# Patient Record
Sex: MALE | Race: OTHER | HISPANIC OR LATINO
[De-identification: names, ages, dates, MRNs, and addresses within clinical notes are randomized per-mention and may not be internally consistent; named-entity substitution may affect disease eponyms.]

---

## 2022-03-03 ENCOUNTER — APPOINTMENT (OUTPATIENT)
Dept: OTOLARYNGOLOGY | Facility: CLINIC | Age: 72
End: 2022-03-03
Payer: MEDICARE

## 2022-03-03 VITALS
SYSTOLIC BLOOD PRESSURE: 133 MMHG | DIASTOLIC BLOOD PRESSURE: 80 MMHG | BODY MASS INDEX: 34.54 KG/M2 | HEART RATE: 86 BPM | HEIGHT: 72 IN | WEIGHT: 255 LBS

## 2022-03-03 DIAGNOSIS — H61.20 IMPACTED CERUMEN, UNSPECIFIED EAR: ICD-10-CM

## 2022-03-03 PROCEDURE — 69210 REMOVE IMPACTED EAR WAX UNI: CPT

## 2022-03-03 PROCEDURE — 99203 OFFICE O/P NEW LOW 30 MIN: CPT | Mod: 25

## 2022-03-03 NOTE — PHYSICAL EXAM
[FreeTextEntry1] : CONGENITAL EXOPHTHALMIA [de-identified] : LEFT IMPACTED CERUMEN REMOVED/ HEARING IMPROVED [Normal] : mucosa is normal [Midline] : trachea located in midline position

## 2022-03-03 NOTE — REVIEW OF SYSTEMS
[Eyes Itch] : itching of the eyes [Joint Pain] : joint pain [Negative] : Heme/Lymph [Patient Intake Form Reviewed] : Patient intake form was reviewed

## 2023-05-17 ENCOUNTER — INPATIENT (INPATIENT)
Facility: HOSPITAL | Age: 73
LOS: 3 days | Discharge: HOME | DRG: 481 | End: 2023-05-21
Attending: ORTHOPAEDIC SURGERY | Admitting: STUDENT IN AN ORGANIZED HEALTH CARE EDUCATION/TRAINING PROGRAM
Payer: MEDICARE

## 2023-05-17 ENCOUNTER — TRANSCRIPTION ENCOUNTER (OUTPATIENT)
Age: 73
End: 2023-05-17

## 2023-05-17 VITALS
SYSTOLIC BLOOD PRESSURE: 156 MMHG | HEART RATE: 92 BPM | DIASTOLIC BLOOD PRESSURE: 73 MMHG | WEIGHT: 259.93 LBS | RESPIRATION RATE: 18 BRPM | TEMPERATURE: 98 F | OXYGEN SATURATION: 97 %

## 2023-05-17 DIAGNOSIS — Z95.5 PRESENCE OF CORONARY ANGIOPLASTY IMPLANT AND GRAFT: Chronic | ICD-10-CM

## 2023-05-17 DIAGNOSIS — S82.91XA UNSPECIFIED FRACTURE OF RIGHT LOWER LEG, INITIAL ENCOUNTER FOR CLOSED FRACTURE: Chronic | ICD-10-CM

## 2023-05-17 DIAGNOSIS — S72.009A FRACTURE OF UNSPECIFIED PART OF NECK OF UNSPECIFIED FEMUR, INITIAL ENCOUNTER FOR CLOSED FRACTURE: ICD-10-CM

## 2023-05-17 DIAGNOSIS — Z98.890 OTHER SPECIFIED POSTPROCEDURAL STATES: Chronic | ICD-10-CM

## 2023-05-17 LAB
ALBUMIN SERPL ELPH-MCNC: 4.2 G/DL — SIGNIFICANT CHANGE UP (ref 3.3–5.2)
ALP SERPL-CCNC: 72 U/L — SIGNIFICANT CHANGE UP (ref 40–120)
ALT FLD-CCNC: 24 U/L — SIGNIFICANT CHANGE UP
ANION GAP SERPL CALC-SCNC: 15 MMOL/L — SIGNIFICANT CHANGE UP (ref 5–17)
APTT BLD: 33.1 SEC — SIGNIFICANT CHANGE UP (ref 27.5–35.5)
AST SERPL-CCNC: 30 U/L — SIGNIFICANT CHANGE UP
BASOPHILS # BLD AUTO: 0.01 K/UL — SIGNIFICANT CHANGE UP (ref 0–0.2)
BASOPHILS NFR BLD AUTO: 0.1 % — SIGNIFICANT CHANGE UP (ref 0–2)
BILIRUB SERPL-MCNC: 0.4 MG/DL — SIGNIFICANT CHANGE UP (ref 0.4–2)
BLD GP AB SCN SERPL QL: SIGNIFICANT CHANGE UP
BUN SERPL-MCNC: 21.7 MG/DL — HIGH (ref 8–20)
CALCIUM SERPL-MCNC: 9.3 MG/DL — SIGNIFICANT CHANGE UP (ref 8.4–10.5)
CHLORIDE SERPL-SCNC: 100 MMOL/L — SIGNIFICANT CHANGE UP (ref 96–108)
CO2 SERPL-SCNC: 25 MMOL/L — SIGNIFICANT CHANGE UP (ref 22–29)
CREAT SERPL-MCNC: 0.84 MG/DL — SIGNIFICANT CHANGE UP (ref 0.5–1.3)
EGFR: 93 ML/MIN/1.73M2 — SIGNIFICANT CHANGE UP
EOSINOPHIL # BLD AUTO: 0.22 K/UL — SIGNIFICANT CHANGE UP (ref 0–0.5)
EOSINOPHIL NFR BLD AUTO: 3 % — SIGNIFICANT CHANGE UP (ref 0–6)
GLUCOSE BLDC GLUCOMTR-MCNC: 153 MG/DL — HIGH (ref 70–99)
GLUCOSE SERPL-MCNC: 107 MG/DL — HIGH (ref 70–99)
HCT VFR BLD CALC: 39.7 % — SIGNIFICANT CHANGE UP (ref 39–50)
HGB BLD-MCNC: 13.2 G/DL — SIGNIFICANT CHANGE UP (ref 13–17)
IMM GRANULOCYTES NFR BLD AUTO: 0.5 % — SIGNIFICANT CHANGE UP (ref 0–0.9)
INR BLD: 1.11 RATIO — SIGNIFICANT CHANGE UP (ref 0.88–1.16)
LYMPHOCYTES # BLD AUTO: 1.29 K/UL — SIGNIFICANT CHANGE UP (ref 1–3.3)
LYMPHOCYTES # BLD AUTO: 17.6 % — SIGNIFICANT CHANGE UP (ref 13–44)
MCHC RBC-ENTMCNC: 31.6 PG — SIGNIFICANT CHANGE UP (ref 27–34)
MCHC RBC-ENTMCNC: 33.2 GM/DL — SIGNIFICANT CHANGE UP (ref 32–36)
MCV RBC AUTO: 95 FL — SIGNIFICANT CHANGE UP (ref 80–100)
MONOCYTES # BLD AUTO: 0.51 K/UL — SIGNIFICANT CHANGE UP (ref 0–0.9)
MONOCYTES NFR BLD AUTO: 7 % — SIGNIFICANT CHANGE UP (ref 2–14)
NEUTROPHILS # BLD AUTO: 5.24 K/UL — SIGNIFICANT CHANGE UP (ref 1.8–7.4)
NEUTROPHILS NFR BLD AUTO: 71.8 % — SIGNIFICANT CHANGE UP (ref 43–77)
PLATELET # BLD AUTO: 142 K/UL — LOW (ref 150–400)
POTASSIUM SERPL-MCNC: 4 MMOL/L — SIGNIFICANT CHANGE UP (ref 3.5–5.3)
POTASSIUM SERPL-SCNC: 4 MMOL/L — SIGNIFICANT CHANGE UP (ref 3.5–5.3)
PROT SERPL-MCNC: 6.8 G/DL — SIGNIFICANT CHANGE UP (ref 6.6–8.7)
PROTHROM AB SERPL-ACNC: 12.9 SEC — SIGNIFICANT CHANGE UP (ref 10.5–13.4)
RBC # BLD: 4.18 M/UL — LOW (ref 4.2–5.8)
RBC # FLD: 13.5 % — SIGNIFICANT CHANGE UP (ref 10.3–14.5)
SODIUM SERPL-SCNC: 140 MMOL/L — SIGNIFICANT CHANGE UP (ref 135–145)
WBC # BLD: 7.31 K/UL — SIGNIFICANT CHANGE UP (ref 3.8–10.5)
WBC # FLD AUTO: 7.31 K/UL — SIGNIFICANT CHANGE UP (ref 3.8–10.5)

## 2023-05-17 PROCEDURE — 99223 1ST HOSP IP/OBS HIGH 75: CPT | Mod: 57

## 2023-05-17 PROCEDURE — 71045 X-RAY EXAM CHEST 1 VIEW: CPT | Mod: 26

## 2023-05-17 PROCEDURE — 99285 EMERGENCY DEPT VISIT HI MDM: CPT

## 2023-05-17 PROCEDURE — 73552 X-RAY EXAM OF FEMUR 2/>: CPT | Mod: 26,LT

## 2023-05-17 PROCEDURE — 73502 X-RAY EXAM HIP UNI 2-3 VIEWS: CPT | Mod: 26,LT

## 2023-05-17 PROCEDURE — 93010 ELECTROCARDIOGRAM REPORT: CPT

## 2023-05-17 RX ORDER — ENOXAPARIN SODIUM 100 MG/ML
40 INJECTION SUBCUTANEOUS ONCE
Refills: 0 | Status: COMPLETED | OUTPATIENT
Start: 2023-05-17 | End: 2023-05-17

## 2023-05-17 RX ORDER — ATORVASTATIN CALCIUM 80 MG/1
1 TABLET, FILM COATED ORAL
Refills: 0 | DISCHARGE

## 2023-05-17 RX ORDER — DEXTROSE 50 % IN WATER 50 %
25 SYRINGE (ML) INTRAVENOUS ONCE
Refills: 0 | Status: DISCONTINUED | OUTPATIENT
Start: 2023-05-17 | End: 2023-05-18

## 2023-05-17 RX ORDER — CARVEDILOL PHOSPHATE 80 MG/1
3.12 CAPSULE, EXTENDED RELEASE ORAL EVERY 12 HOURS
Refills: 0 | Status: DISCONTINUED | OUTPATIENT
Start: 2023-05-17 | End: 2023-05-18

## 2023-05-17 RX ORDER — POVIDONE-IODINE 5 %
1 AEROSOL (ML) TOPICAL ONCE
Refills: 0 | Status: DISCONTINUED | OUTPATIENT
Start: 2023-05-17 | End: 2023-05-18

## 2023-05-17 RX ORDER — MORPHINE SULFATE 50 MG/1
4 CAPSULE, EXTENDED RELEASE ORAL ONCE
Refills: 0 | Status: DISCONTINUED | OUTPATIENT
Start: 2023-05-17 | End: 2023-05-17

## 2023-05-17 RX ORDER — COLESEVELAM HYDROCHLORIDE 625 MG/1
3 TABLET, FILM COATED ORAL
Refills: 0 | DISCHARGE

## 2023-05-17 RX ORDER — AMLODIPINE BESYLATE 2.5 MG/1
10 TABLET ORAL DAILY
Refills: 0 | Status: DISCONTINUED | OUTPATIENT
Start: 2023-05-17 | End: 2023-05-18

## 2023-05-17 RX ORDER — ASPIRIN/CALCIUM CARB/MAGNESIUM 324 MG
1 TABLET ORAL
Refills: 0 | DISCHARGE

## 2023-05-17 RX ORDER — OXYCODONE HYDROCHLORIDE 5 MG/1
10 TABLET ORAL EVERY 4 HOURS
Refills: 0 | Status: DISCONTINUED | OUTPATIENT
Start: 2023-05-17 | End: 2023-05-18

## 2023-05-17 RX ORDER — TRANEXAMIC ACID 100 MG/ML
1000 INJECTION, SOLUTION INTRAVENOUS ONCE
Refills: 0 | Status: COMPLETED | OUTPATIENT
Start: 2023-05-17 | End: 2023-05-17

## 2023-05-17 RX ORDER — CHLORHEXIDINE GLUCONATE 213 G/1000ML
1 SOLUTION TOPICAL
Refills: 0 | Status: DISCONTINUED | OUTPATIENT
Start: 2023-05-17 | End: 2023-05-18

## 2023-05-17 RX ORDER — ICOSAPENT ETHYL 500 MG/1
2 CAPSULE, LIQUID FILLED ORAL
Refills: 0 | DISCHARGE

## 2023-05-17 RX ORDER — DEXTROSE 50 % IN WATER 50 %
15 SYRINGE (ML) INTRAVENOUS ONCE
Refills: 0 | Status: DISCONTINUED | OUTPATIENT
Start: 2023-05-17 | End: 2023-05-18

## 2023-05-17 RX ORDER — POTASSIUM CHLORIDE 20 MEQ
1 PACKET (EA) ORAL
Refills: 0 | DISCHARGE

## 2023-05-17 RX ORDER — LOSARTAN POTASSIUM 100 MG/1
1 TABLET, FILM COATED ORAL
Refills: 0 | DISCHARGE

## 2023-05-17 RX ORDER — SODIUM CHLORIDE 9 MG/ML
1000 INJECTION, SOLUTION INTRAVENOUS
Refills: 0 | Status: DISCONTINUED | OUTPATIENT
Start: 2023-05-17 | End: 2023-05-18

## 2023-05-17 RX ORDER — PIOGLITAZONE HCL AND METFORMIN HCL 850; 15 MG/1; MG/1
1 TABLET ORAL
Refills: 0 | DISCHARGE

## 2023-05-17 RX ORDER — ACETAMINOPHEN 500 MG
975 TABLET ORAL ONCE
Refills: 0 | Status: COMPLETED | OUTPATIENT
Start: 2023-05-17 | End: 2023-05-17

## 2023-05-17 RX ORDER — MUPIROCIN 20 MG/G
1 OINTMENT TOPICAL
Refills: 0 | Status: DISCONTINUED | OUTPATIENT
Start: 2023-05-17 | End: 2023-05-18

## 2023-05-17 RX ORDER — HYDROCHLOROTHIAZIDE 25 MG
1 TABLET ORAL
Refills: 0 | DISCHARGE

## 2023-05-17 RX ORDER — DEXTROSE 50 % IN WATER 50 %
12.5 SYRINGE (ML) INTRAVENOUS ONCE
Refills: 0 | Status: DISCONTINUED | OUTPATIENT
Start: 2023-05-17 | End: 2023-05-18

## 2023-05-17 RX ORDER — INSULIN LISPRO 100/ML
VIAL (ML) SUBCUTANEOUS
Refills: 0 | Status: DISCONTINUED | OUTPATIENT
Start: 2023-05-17 | End: 2023-05-18

## 2023-05-17 RX ORDER — GLUCAGON INJECTION, SOLUTION 0.5 MG/.1ML
1 INJECTION, SOLUTION SUBCUTANEOUS ONCE
Refills: 0 | Status: DISCONTINUED | OUTPATIENT
Start: 2023-05-17 | End: 2023-05-18

## 2023-05-17 RX ORDER — OXYCODONE HYDROCHLORIDE 5 MG/1
5 TABLET ORAL EVERY 4 HOURS
Refills: 0 | Status: DISCONTINUED | OUTPATIENT
Start: 2023-05-17 | End: 2023-05-18

## 2023-05-17 RX ORDER — LOSARTAN POTASSIUM 100 MG/1
25 TABLET, FILM COATED ORAL DAILY
Refills: 0 | Status: DISCONTINUED | OUTPATIENT
Start: 2023-05-17 | End: 2023-05-18

## 2023-05-17 RX ORDER — ATORVASTATIN CALCIUM 80 MG/1
40 TABLET, FILM COATED ORAL AT BEDTIME
Refills: 0 | Status: DISCONTINUED | OUTPATIENT
Start: 2023-05-17 | End: 2023-05-18

## 2023-05-17 RX ORDER — CARVEDILOL PHOSPHATE 80 MG/1
1 CAPSULE, EXTENDED RELEASE ORAL
Refills: 0 | DISCHARGE

## 2023-05-17 RX ORDER — CLOPIDOGREL BISULFATE 75 MG/1
1 TABLET, FILM COATED ORAL
Refills: 0 | DISCHARGE

## 2023-05-17 RX ADMIN — ATORVASTATIN CALCIUM 40 MILLIGRAM(S): 80 TABLET, FILM COATED ORAL at 22:00

## 2023-05-17 RX ADMIN — Medication 975 MILLIGRAM(S): at 15:46

## 2023-05-17 RX ADMIN — Medication 975 MILLIGRAM(S): at 14:26

## 2023-05-17 RX ADMIN — Medication 2: at 22:00

## 2023-05-17 RX ADMIN — MORPHINE SULFATE 4 MILLIGRAM(S): 50 CAPSULE, EXTENDED RELEASE ORAL at 18:46

## 2023-05-17 RX ADMIN — OXYCODONE HYDROCHLORIDE 10 MILLIGRAM(S): 5 TABLET ORAL at 22:00

## 2023-05-17 RX ADMIN — MORPHINE SULFATE 4 MILLIGRAM(S): 50 CAPSULE, EXTENDED RELEASE ORAL at 16:45

## 2023-05-17 RX ADMIN — TRANEXAMIC ACID 220 MILLIGRAM(S): 100 INJECTION, SOLUTION INTRAVENOUS at 18:58

## 2023-05-17 NOTE — H&P ADULT - NSHPLABSRESULTS_GEN_ALL_CORE
13.2   7.31  )-----------( 142      ( 17 May 2023 14:20 )             39.7     05-17    140  |  100  |  21.7<H>  ----------------------------<  107<H>  4.0   |  25.0  |  0.84    Ca    9.3      17 May 2023 14:20    TPro  6.8  /  Alb  4.2  /  TBili  0.4  /  DBili  x   /  AST  30  /  ALT  24  /  AlkPhos  72  05-17      PT/INR - ( 17 May 2023 14:20 )   PT: 12.9 sec;   INR: 1.11 ratio         PTT - ( 17 May 2023 14:20 )  PTT:33.1 sec    Xrays: left proximal femur oblique displaced fracture

## 2023-05-17 NOTE — H&P ADULT - NSICDXPASTSURGICALHX_GEN_ALL_CORE_FT
PAST SURGICAL HISTORY:  Fracture of right lower leg     S/P cardiac catheterization     S/P coronary artery stent placement     S/P hernia repair

## 2023-05-17 NOTE — H&P ADULT - NSICDXPASTMEDICALHX_GEN_ALL_CORE_FT
PAST MEDICAL HISTORY:  CAD (coronary artery disease)     Hyperlipidemia     Hypertension     Type 2 diabetes mellitus

## 2023-05-17 NOTE — ED ADULT TRIAGE NOTE - CHIEF COMPLAINT QUOTE
Pt with c/o trip and fall down 2 stairs causing injury to L hip, L knee, L thigh and EMS reports pt had externally rotated and shortened LLE. Pt denies head injury and takes Plavix and ASA daily.

## 2023-05-17 NOTE — ED PROVIDER NOTE - ATTENDING CONTRIBUTION TO CARE
The patient seen with resident    Femur Fracture    I, Jonathan Prado, performed the initial face to face bedside interview with this patient regarding history of present illness, review of symptoms and relevant past medical, social and family history.  I completed an independent physical examination.  I was the initial provider who evaluated this patient. I have signed out the follow up of any pending tests (i.e. labs, radiological studies) to the resident.  I have communicated the patient’s plan of care and disposition with the resident

## 2023-05-17 NOTE — PATIENT PROFILE ADULT - FALL HARM RISK - HARM RISK INTERVENTIONS
01-Apr-2023 Communicate Risk of Fall with Harm to all staff/Reinforce activity limits and safety measures with patient and family/Tailored Fall Risk Interventions/Visual Cue: Yellow wristband and red socks/Bed in lowest position, wheels locked, appropriate side rails in place/Call bell, personal items and telephone in reach/Instruct patient to call for assistance before getting out of bed or chair/Non-slip footwear when patient is out of bed/[place] to call system/Physically safe environment - no spills, clutter or unnecessary equipment/Purposeful Proactive Rounding/Room/bathroom lighting operational, light cord in reach

## 2023-05-17 NOTE — PROGRESS NOTE ADULT - SUBJECTIVE AND OBJECTIVE BOX
72 year old male with PMH of CAD s/p PCI, HTN, HLD, Type 2 DM.  Patient presents with left proximal femur fracture after fall; he denies head trauma and LOC.  EKG ordered and pending.  Medical consult pending.  After thorough chart review, no further work-up recommended if EKG within normal limits.  Ultimately, decision to proceed with surgery is deferred to anesthesiologist on day of surgery.    Kian Barbosa DO  Attending Anesthesiologist

## 2023-05-17 NOTE — ED ADULT NURSE NOTE - NSFALLRISKINTERV_ED_ALL_ED

## 2023-05-17 NOTE — ED ADULT NURSE NOTE - OBJECTIVE STATEMENT
Pt reports to the ED s/p mechanical fall. pt a&ox4, reports he was going down 1 step and lost his balance causing his leg to twits. Reports pain to L hip, knee and thigh. Splint in place from EMS> upon assessment, pt noted with L leg shortened. Denies any head strike, put reports taking blood thinner daily. RR even and unlabored, safety maintained.

## 2023-05-17 NOTE — H&P ADULT - HISTORY OF PRESENT ILLNESS
Patient seen and eval at bedside. Patient states he tripped and fell on stoop in-front of house. Patient denies LOC, head trauma. Patient is independent ambulator, took plavix and and aspirin this morning s/p cardiac stents. Patient denies pain anywhere else in his body. Patient here with wife at bedside.

## 2023-05-17 NOTE — H&P ADULT - ASSESSMENT
A/P: 73 yo M with left proximal femur fracture    ·	Keep NPO  ·	Anesthesia aware  ·	Med consulted for optimization  ·	Pain control  ·	NWB left LE/bedrest  ·	MUP/MRSA swab  ·	Tranexamic acid  ·	D/w Dr. Dallas  ·	Awaiting further recs from Dr. Dallas regarding surgical plan

## 2023-05-17 NOTE — PROGRESS NOTE ADULT - SUBJECTIVE AND OBJECTIVE BOX
Medical consult for OR clearance 5/18 placed. Discussed with On-call hospitalist, given cardiac history patient a cardiology consult is needed. Patient follows with Dr. Younger with [facility]. Cardiology consult placed for OR 5/18  clearance inhouse extension [phone]. Anesthesia also aware.

## 2023-05-17 NOTE — PATIENT PROFILE ADULT - NSTOBACCONEVERSMOKERY/N_GEN_A
"Patient's wife called yelling at  staff because they had not yet received their requested prescription the way it needed to be written.   staff gave the call to me.  I spoke with his wife and assured her that I would address the issue before leaving the office today.  I explained the request to Gloria Pierson who re-wrote the script the way the pharmacist requested it be done.  I then called the pharmacy to make sure they received it and it was properly written.  The pharmacist I spoke with said it was now correct and he would fill it.  I then contacted the patient and told him that the pharmacist has confirmed that he is preparing the medication and it should be ready to be picked up shortly.  The patient said, \"Thank you\", then hung up.    "
How long have you been sick? 1 DAY  Cough (productive or nonproductive)? YES, DRY COUGH  Color of phlegm? NO  Sinus pain? NO  Sinus drainage/color? NO  Earache? NO  Congestion? NO  Headache? NO  Fever (if yes, temp?) YES,   Sore throat? YES  Short of breath/wheezing? NO  OTC medication? TYLENOL  DIZZINESS    PATIENT TESTED POSITIVE FOR COVID 03/07/2023, AND LOOKING FOR TREATMENT    PH[facility]Y: [facility]    CALL WIFE -[phone] (ROCK)  
Pharmacy called and said the instructions should say 800mg [id number] for 5 days NOT 800mg x1 daily for 5 days. They said they can't dispense it with these instructions?  
Pt would like that medicine sent to [facility] ASAP. Thank you!  
Wife would like TED called in as the patient currently takes MULTAQ and is not advisable to take PAXLOVID with  that med  
No

## 2023-05-17 NOTE — ED PROVIDER NOTE - OBJECTIVE STATEMENT
The patient is a 72 year old male presents with left hip pain after falling from stiairs (missed steps)  No head trauma, No HA, No neck pain, No CP, No SOB, No abd pain, No motor No sensory loss

## 2023-05-17 NOTE — PATIENT PROFILE ADULT - MST SCORE
2021    Jose Dobson MD    No chief complaint on file.      HPI:    64 y/o male w/ h/o HTN, DM, chronic systolic HF s/p ICD 2019 and double aortic arch presenting for evaluation. He was born with a heart murmur. He was told he had a weak heart/heart failure 10 years ago. He has been on medications for years and been compliant.  He denies being hospitalized in the last year.  Has sleep apnea and uses a CPAP. He was shocked by his defibrillator before.      C/o SOB on exertion, has some wheezing.  Sleeps with 3 pillows at night. Denies any chest pain/tightness. He has been feeling worse since the Entresto was stopped due to insurance issues.     3/16/21- Feels good. Denies any chest pain, dizziness, palpitations. Has some SOB when bending down. Has had decreased swelling since last visit. Has some early satiety/abdominal bloating sometimes.     21- overall patient feeling well.  Denies SOB/SARMIENTO.  Having some diarrhea.  No LE edema.  Trying to increase his activity.  Walked to corner store and back the other day without major issue (several blocks).  Using CPAP.    SHx:  Tob: Denies   EtOH: Occasionally socially- 1-2 drinks   Illicits: Marijuana occasionally in the past    FHx:  Mother: HTN, heart disease,  at age 87  Father: unknown    Brother: heart disease requiring ICD at 59y/o, MI with stents  Brother: surgery    Unemployed currently- worked as a sickweather.    Relevant Past Medical History:  Past Medical History:   Diagnosis Date   • Asthma    • Benign colonic polyp    • Carpal tunnel syndrome    • Congestive cardiac failure (CMS/HCC)    • DM (diabetes mellitus) (CMS/HCC)    • Essential (primary) hypertension    • Hyperlipidemia    • Myocardiopathy (CMS/HCC)    • SAS (sleep apnea syndrome)      Patient Active Problem List   Diagnosis   • Obstructive sleep apnea syndrome   • Asthma   • BPH (benign prostatic hyperplasia)   • Benign colonic polyp   • Carpal tunnel syndrome   • Diabetes mellitus  (CMS/[facility])   • HFrEF (heart failure with reduced ejection fraction) (CMS/[facility])   • Dyslipidemia associated with type 2 diabetes mellitus (CMS/[facility])   • Essential hypertension   • Erectile dysfunction associated with type 2 diabetes mellitus (CMS/[facility])   • Hypertensive heart disease with chronic combined systolic and diastolic congestive heart failure (CMS/[facility])   • ACC/AHA stage C chronic systolic heart failure (CMS/[facility])   • Cough   • Gastroesophageal reflux disease without esophagitis   • Double aortic arch   • VT (ventricular tachycardia) (CMS/[facility])     Review of Systems   Constitutional: Negative.    HENT: Negative.    Eyes: Negative.    Respiratory: Positive for cough, chest tightness, shortness of breath and wheezing.    Cardiovascular: Negative.    Gastrointestinal: Negative.    Endocrine: Negative.    Genitourinary: Negative.    Musculoskeletal: Negative.    Skin: Negative.    Allergic/Immunologic: Negative.    Neurological: Negative.    Psychiatric/Behavioral: Negative.      ALLERGIES:   Allergen Reactions   • Penicillins Other (See Comments)     Angioedema     Past Surgical History:  Past Surgical History:   Procedure Laterality Date   • Cardiac defibrillator placement       Current Medications:   Current Outpatient Medications   Medication Sig Dispense Refill   • furosemide (LASIX) 40 MG tablet Take 1 tabs in am, 1 tab in pm. 270 tablet 2   • dapagliflozin (Farxiga) 5 MG tablet Take 1 tablet by mouth daily. 30 tablet 11   • budesonide-formoterol (Symbicort) 160-4.5 MCG/ACT inhaler Inhale 2 puffs into the lungs 2 times daily. 3 Inhaler 3   • carvedilol (COREG) 12.5 MG tablet TAKE 1 TABLET TWICE A  tablet 2   • metFORMIN (GLUCOPHAGE) 500 MG tablet Take 1 tablet by mouth 2 times daily. 180 tablet 2   • potassium chloride (K-TAB) 20 MEQ ER tablet Take 20 mEq by mouth 2 times daily. 180 tablet 2   • spironolactone (ALDACTONE) 25 MG tablet Take 1 tablet by mouth every other day. On Mondays, Wednesdays, and  Fridays only 45 tablet 2   • tamsulosin (FLOMAX) 0.4 MG Cap Take 1 capsule by mouth daily. 90 capsule 2   • valsartan (DIOVAN) 160 MG tablet Take 1 tablet by mouth daily. 90 tablet 2   • atorvastatin (LIPITOR) 20 MG tablet Take 1 tablet by mouth daily. 90 tablet 2   • Magnesium Oxide 400 MG Cap Take 800 mg by mouth 2 times daily. 30 capsule 11   • albuterol (PROAIR RESPICLICK) 108 (90 Base) MCG/ACT inhaler Inhale 2 puffs into the lungs every 4 hours as needed for Shortness of Breath or Wheezing. 3 Inhaler 1   • calcium carbonate-cholecalciferol 600-800 MG-UNIT tablet Take 1 tablet by mouth 2 times daily.     • aspirin 81 MG tablet Take 81 mg by mouth daily.       No current facility-administered medications for this visit.       Social History     Socioeconomic History   • Marital status: /Civil Union     Spouse name: Not on file   • Number of children: Not on file   • Years of education: Not on file   • Highest education level: Not on file   Occupational History   • Not on file   Tobacco Use   • Smoking status: Never Smoker   • Smokeless tobacco: Never Used   Substance and Sexual Activity   • Alcohol use: Yes     Alcohol/week: 2.0 standard drinks     Types: 2 Glasses of wine per week     Comment: occasionally   • Drug use: Never   • Sexual activity: Not on file   Other Topics Concern   • Not on file   Social History Narrative   • Not on file     Social Determinants of Health     Financial Resource Strain:    • Social Determinants: Financial Resource Strain:    Food Insecurity:    • Social Determinants: Food Insecurity:    Transportation Needs: No Transportation Needs   • Lack of Transportation (Medical): No   • Lack of Transportation (Non-Medical): No   Physical Activity:    • Days of Exercise per Week:    • Minutes of Exercise per Session:    Stress:    • Social Determinants: Stress:    Social Connections:    • Social Determinants: Social Connections:    Intimate Partner Violence:    • Social Determinants:  Intimate Partner Violence Past Fear:    • Social Determinants: Intimate Partner Violence Current Fear:        Family History:  family history includes Congestive Heart Failure in his brother; Dementia/Alzheimers in his sister; Diabetes in his mother and another family member; Heart disease in his mother; Hypertension in an other family member.    Examination:   Blood pressure 95/63, pulse 73, temperature 97.7 °F (36.5 °C), resp. rate 16, height 5' 7\" (1.702 m), weight 86.7 kg (191 lb 2.2 oz), SpO2 97 %.  Weight    04/27/21 1416   Weight: 86.7 kg (191 lb 2.2 oz)       Physical Exam   Constitutional: He is oriented to person, place, and time. He appears well-developed and well-nourished.   HENT:   Head: Normocephalic.   Eyes: Pupils are equal, round, and reactive to light. Conjunctivae and EOM are normal.   Neck: No JVD present.   Cardiovascular: Normal rate, regular rhythm, normal heart sounds and intact distal pulses.   Pulmonary/Chest: Effort normal and breath sounds normal.   Abdominal: Soft. Bowel sounds are normal.   Musculoskeletal:         General: No edema. Normal range of motion.      Cervical back: Normal range of motion.   Neurological: He is alert and oriented to person, place, and time.   Skin: Skin is warm and dry.   Psychiatric: He has a normal mood and affect. His behavior is normal.   Nursing note and vitals reviewed.      Vitals:    04/27/21 1416   BP: 95/63   Pulse: 73   Resp: 16   Temp: 97.7 °F (36.5 °C)       Vitals:    04/27/21 1416   BP: 95/63   Pulse: 73   Resp: 16   Temp: 97.7 °F (36.5 °C)       Diagnostic data:  Sodium (mmol/L)   Date Value   04/12/2021 136     Potassium (mmol/L)   Date Value   04/12/2021 4.8     Chloride (mmol/L)   Date Value   04/12/2021 103     Glucose (mg/dL)   Date Value   04/12/2021 88     Calcium (mg/dL)   Date Value   04/12/2021 9.9     Carbon Dioxide (mmol/L)   Date Value   04/12/2021 29     BUN (mg/dL)   Date Value   04/12/2021 29 (H)     Creatinine (mg/dL)   Date  Value   04/12/2021 1.61 (H)       No results found for: BNP    Lab Results   Component Value Date    NTPROB 947 (H) 03/03/2021    NTPROB 2,513 (H) 02/23/2021    NTPROB 779 (H) 10/10/2020         EKG:   Results for orders placed or performed in visit on 01/03/19   ELECTROCARDIOGRAM 12-LEAD    Impression    Sinus rhythm, 71 bpm.  First degree AVB.  IVCD (MJZc=316 ms).     PERTINENT CARDIODIAGNOSTICS:    TTE 2/24/21- EF: 25%     TTE 01/20/20 - EF 20-25%; RVSP 48 mm Hg      TTE 12/20/18 - EF 25-30%     TTE 04/03/18 - EF 30-35%; [id number]; RVSP 50 mmHg     TTE 12/13/17 - EF 35%     TTE 10/13/16 - EF 35-40%      Stress echo 01/19/16   Stress ECHO:  No gross evidence of inducible ischemia at 84% target heart rate by stress echocardiogram.  Baseline electrocardiogram sinus rhythm, low voltage, old AWMI, LAFB.  Baseline echocardiogram normal left ventricular chamber size when indexed to BSA, without left ventricular hypertrophy, preserved wall motion and adequate global systolic function.   Normal blood pressure/ heart rate response during stress testing.  Nonsustained ventricular tachycardia at peak HR.  NYHA Functional Class I, total METs achieved 6.9.  EF 40-45%     TTE 7/30/15  Conclusions:  Normal left ventricular chamber size when indexed to BSA, without hypertrophy, with decreased systolic function, EF 33%   Abnormal LV diastolic function, with INcrease in LV diastolic filling pressure.  Abnormal left atrial volume index with normal right-sided chamber dimensions.  Indeterminate right ventricular systolic pressure with normal right ventricular systolic function.     TTE 4/6/15  Conclusions:  Upper Normal left ventricular chamber size without hypertrophy, with global hypokinesis of wall motion with severe systolic dysfunction, EF= 25%   Grade 3 LV diastolic dysfunction, with increase in LV diastolic filling pressure.  Moderately abnormal Left Atrial Volume Index with normal right-sided chamber dimensions.  Mild mitral  and tricuspid regurgitation.  Indirect signs of mildly elevated right ventricular systolic pressure with low normal to reduced right ventricular systolic function.     Assessment/Plan:    ASSESSMENT/PLAN:     Chronic systolic heart failure s/p ICD  - Stage C, NYHA FC III - euvolemic on exam  - Etiology - presumed NICM - No LHC on file  - Echo - 1/20/2020 - EF: 10-15% (personal review)  LVEDD: 5.7cm  mod-severe MR  nl RV size with mild-mod reduced RV fxn  IVC collapsible  RVSP: 42mmHg    - GDMT: carvedilol 12.5 mg BID, valsartan 160 mg BID, spironolactone 25 mg MWF, furosemide 40mg BID   - SGLT2i: farxiga 5mg daily  - Plan to change valsartan back to Enresto once approved by insurance  - cont. Present GDMT but needs increased diuretics  - high risk features: severe LV dysfunction  severely elevated ntprobnp  worsening volume status  possible prior ICD shocks.  - Barriers to advanced therapies: inisight - although patient has support from wife.    Double Aortic Arch  - mild tracheal compression but unlikely primary cause of patient's SOB  - could be an issue anatomically for advanced therapies  - discussed with Dr. Hsu, congential specialist, unlikely that tracheal compression is primary etiology of patient's SOB and unlikely to benefit from tracheal stenting.      Hypertension  - BP well controlled  - Recommend same as HF plan     VT  - NSVT noted on ICD interrogation in the past  - no ICD shocks on interrogations in Epic although those only date back to 2018    DM2  - on metformin  - start farxiga 5mg daily    -Discussed with Dr. Hsu, congential specialist, unlikely that tracheal compression is primary etiology of patient's SOB and unlikely to benefit from tracheal stenting. Discussed with CV surgery, will defer any surgical/interventional procedure until HF is optimized    Plan:  - still awaiting entresto approval via insurance  - cont. All other medications as prescribed  - RTC in ~2-3 months with  echo  - overall plan for now will be continued medical optimization.      Femi Rae MD [facility]  Advanced Heart Failure and Transplant Cardiology  Clinical  - [facility] Cardiology/Internal Medicine   Director - Cardio-Oncology - [facility]  [facility]                         0

## 2023-05-17 NOTE — ED ADULT NURSE REASSESSMENT NOTE - NS ED NURSE REASSESS COMMENT FT1
Pt c/o of pain to L leg, medicated as per EMAR, a&ox4, ortho at bedside assessing pt. Render Note In Bullet Format When Appropriate: No Show Aperture Variable?: Yes Consent: The patient's consent was obtained including but not limited to risks of crusting, scabbing, blistering, scarring, darker or lighter pigmentary change, recurrence, incomplete removal and infection. Detail Level: Simple Post-Care Instructions: I reviewed with the patient in detail post-care instructions. Patient verbalizes understanding and written instructions were provided. Patient is to wear sunprotection, and avoid picking at any of the treated lesions. Pt may apply Vaseline to crusted or scabbing areas. Duration Of Freeze Thaw-Cycle (Seconds): 0

## 2023-05-17 NOTE — H&P ADULT - NS ATTEND AMEND GEN_ALL_CORE FT
Orthopaedic Trauma Surgeon Addendum:    I have reviewed the physician assistant note and agree with the history, exam, and plan of care, except as noted.    Orthopedic Surgery is ready to proceed with surgery pending medical optimization and adequate Operating Room availability. Risks of surgical delay discussed with other providers and staff. Please call with any questions or concerns.     Emre Jamil MD  Orthopaedic Trauma Surgeon  [facility]

## 2023-05-18 ENCOUNTER — TRANSCRIPTION ENCOUNTER (OUTPATIENT)
Age: 73
End: 2023-05-18

## 2023-05-18 DIAGNOSIS — S72.92XA UNSPECIFIED FRACTURE OF LEFT FEMUR, INITIAL ENCOUNTER FOR CLOSED FRACTURE: ICD-10-CM

## 2023-05-18 LAB
A1C WITH ESTIMATED AVERAGE GLUCOSE RESULT: 5.7 % — HIGH (ref 4–5.6)
ABO RH CONFIRMATION: SIGNIFICANT CHANGE UP
ESTIMATED AVERAGE GLUCOSE: 117 MG/DL — HIGH (ref 68–114)
GLUCOSE BLDC GLUCOMTR-MCNC: 115 MG/DL — HIGH (ref 70–99)
GLUCOSE BLDC GLUCOMTR-MCNC: 127 MG/DL — HIGH (ref 70–99)
GLUCOSE BLDC GLUCOMTR-MCNC: 135 MG/DL — HIGH (ref 70–99)
GLUCOSE BLDC GLUCOMTR-MCNC: 143 MG/DL — HIGH (ref 70–99)
GLUCOSE BLDC GLUCOMTR-MCNC: 163 MG/DL — HIGH (ref 70–99)
GLUCOSE BLDC GLUCOMTR-MCNC: 176 MG/DL — HIGH (ref 70–99)
GLUCOSE SERPL-MCNC: 144 MG/DL — HIGH (ref 70–99)
GLUCOSE SERPL-MCNC: 182 MG/DL — HIGH (ref 70–99)
HCV AB S/CO SERPL IA: 0.08 S/CO — SIGNIFICANT CHANGE UP (ref 0–0.99)
HCV AB SERPL-IMP: SIGNIFICANT CHANGE UP
HIV 1 & 2 AB SERPL IA.RAPID: SIGNIFICANT CHANGE UP
MRSA PCR RESULT.: SIGNIFICANT CHANGE UP
S AUREUS DNA NOSE QL NAA+PROBE: SIGNIFICANT CHANGE UP

## 2023-05-18 PROCEDURE — 93306 TTE W/DOPPLER COMPLETE: CPT | Mod: 26

## 2023-05-18 PROCEDURE — 27506 TREATMENT OF THIGH FRACTURE: CPT | Mod: LT

## 2023-05-18 PROCEDURE — 73552 X-RAY EXAM OF FEMUR 2/>: CPT | Mod: 26,LT

## 2023-05-18 PROCEDURE — 99223 1ST HOSP IP/OBS HIGH 75: CPT

## 2023-05-18 DEVICE — BLADE TFNA HELICAL 100MM STRL: Type: IMPLANTABLE DEVICE | Site: LEFT | Status: FUNCTIONAL

## 2023-05-18 DEVICE — SCREW LOKG STRL 5X50MM: Type: IMPLANTABLE DEVICE | Site: LEFT | Status: FUNCTIONAL

## 2023-05-18 DEVICE — IMPLANTABLE DEVICE: Type: IMPLANTABLE DEVICE | Site: LEFT | Status: FUNCTIONAL

## 2023-05-18 RX ORDER — INSULIN LISPRO 100/ML
VIAL (ML) SUBCUTANEOUS
Refills: 0 | Status: DISCONTINUED | OUTPATIENT
Start: 2023-05-18 | End: 2023-05-21

## 2023-05-18 RX ORDER — SODIUM CHLORIDE 9 MG/ML
1000 INJECTION, SOLUTION INTRAVENOUS
Refills: 0 | Status: DISCONTINUED | OUTPATIENT
Start: 2023-05-18 | End: 2023-05-18

## 2023-05-18 RX ORDER — OXYCODONE HYDROCHLORIDE 5 MG/1
10 TABLET ORAL
Refills: 0 | Status: DISCONTINUED | OUTPATIENT
Start: 2023-05-18 | End: 2023-05-21

## 2023-05-18 RX ORDER — SENNA PLUS 8.6 MG/1
2 TABLET ORAL AT BEDTIME
Refills: 0 | Status: DISCONTINUED | OUTPATIENT
Start: 2023-05-18 | End: 2023-05-21

## 2023-05-18 RX ORDER — ONDANSETRON 8 MG/1
4 TABLET, FILM COATED ORAL EVERY 6 HOURS
Refills: 0 | Status: DISCONTINUED | OUTPATIENT
Start: 2023-05-18 | End: 2023-05-21

## 2023-05-18 RX ORDER — OXYCODONE HYDROCHLORIDE 5 MG/1
5 TABLET ORAL
Refills: 0 | Status: DISCONTINUED | OUTPATIENT
Start: 2023-05-18 | End: 2023-05-21

## 2023-05-18 RX ORDER — SODIUM CHLORIDE 9 MG/ML
1000 INJECTION, SOLUTION INTRAVENOUS
Refills: 0 | Status: DISCONTINUED | OUTPATIENT
Start: 2023-05-18 | End: 2023-05-21

## 2023-05-18 RX ORDER — POTASSIUM CHLORIDE 20 MEQ
20 PACKET (EA) ORAL DAILY
Refills: 0 | Status: DISCONTINUED | OUTPATIENT
Start: 2023-05-18 | End: 2023-05-21

## 2023-05-18 RX ORDER — FENTANYL CITRATE 50 UG/ML
50 INJECTION INTRAVENOUS
Refills: 0 | Status: DISCONTINUED | OUTPATIENT
Start: 2023-05-18 | End: 2023-05-18

## 2023-05-18 RX ORDER — LOSARTAN POTASSIUM 100 MG/1
25 TABLET, FILM COATED ORAL DAILY
Refills: 0 | Status: DISCONTINUED | OUTPATIENT
Start: 2023-05-20 | End: 2023-05-21

## 2023-05-18 RX ORDER — DEXTROSE 50 % IN WATER 50 %
25 SYRINGE (ML) INTRAVENOUS ONCE
Refills: 0 | Status: DISCONTINUED | OUTPATIENT
Start: 2023-05-18 | End: 2023-05-21

## 2023-05-18 RX ORDER — GLUCAGON INJECTION, SOLUTION 0.5 MG/.1ML
1 INJECTION, SOLUTION SUBCUTANEOUS ONCE
Refills: 0 | Status: DISCONTINUED | OUTPATIENT
Start: 2023-05-18 | End: 2023-05-21

## 2023-05-18 RX ORDER — CARVEDILOL PHOSPHATE 80 MG/1
3.12 CAPSULE, EXTENDED RELEASE ORAL EVERY 12 HOURS
Refills: 0 | Status: DISCONTINUED | OUTPATIENT
Start: 2023-05-18 | End: 2023-05-21

## 2023-05-18 RX ORDER — ATORVASTATIN CALCIUM 80 MG/1
40 TABLET, FILM COATED ORAL AT BEDTIME
Refills: 0 | Status: DISCONTINUED | OUTPATIENT
Start: 2023-05-18 | End: 2023-05-21

## 2023-05-18 RX ORDER — MAGNESIUM HYDROXIDE 400 MG/1
30 TABLET, CHEWABLE ORAL DAILY
Refills: 0 | Status: DISCONTINUED | OUTPATIENT
Start: 2023-05-18 | End: 2023-05-21

## 2023-05-18 RX ORDER — TRANEXAMIC ACID 100 MG/ML
1000 INJECTION, SOLUTION INTRAVENOUS ONCE
Refills: 0 | Status: DISCONTINUED | OUTPATIENT
Start: 2023-05-18 | End: 2023-05-18

## 2023-05-18 RX ORDER — INSULIN LISPRO 100/ML
VIAL (ML) SUBCUTANEOUS AT BEDTIME
Refills: 0 | Status: DISCONTINUED | OUTPATIENT
Start: 2023-05-18 | End: 2023-05-21

## 2023-05-18 RX ORDER — ACETAMINOPHEN 500 MG
975 TABLET ORAL EVERY 8 HOURS
Refills: 0 | Status: DISCONTINUED | OUTPATIENT
Start: 2023-05-18 | End: 2023-05-21

## 2023-05-18 RX ORDER — HYDROMORPHONE HYDROCHLORIDE 2 MG/ML
0.5 INJECTION INTRAMUSCULAR; INTRAVENOUS; SUBCUTANEOUS
Refills: 0 | Status: DISCONTINUED | OUTPATIENT
Start: 2023-05-18 | End: 2023-05-18

## 2023-05-18 RX ORDER — CEFAZOLIN SODIUM 1 G
2000 VIAL (EA) INJECTION ONCE
Refills: 0 | Status: DISCONTINUED | OUTPATIENT
Start: 2023-05-18 | End: 2023-05-18

## 2023-05-18 RX ORDER — VANCOMYCIN HCL 1 G
1500 VIAL (EA) INTRAVENOUS ONCE
Refills: 0 | Status: COMPLETED | OUTPATIENT
Start: 2023-05-18 | End: 2023-05-18

## 2023-05-18 RX ORDER — CEFAZOLIN SODIUM 1 G
2000 VIAL (EA) INJECTION
Refills: 0 | Status: COMPLETED | OUTPATIENT
Start: 2023-05-18 | End: 2023-05-19

## 2023-05-18 RX ORDER — SODIUM CHLORIDE 9 MG/ML
1000 INJECTION, SOLUTION INTRAVENOUS
Refills: 0 | Status: DISCONTINUED | OUTPATIENT
Start: 2023-05-18 | End: 2023-05-19

## 2023-05-18 RX ORDER — ONDANSETRON 8 MG/1
4 TABLET, FILM COATED ORAL ONCE
Refills: 0 | Status: DISCONTINUED | OUTPATIENT
Start: 2023-05-18 | End: 2023-05-18

## 2023-05-18 RX ORDER — DEXTROSE 50 % IN WATER 50 %
12.5 SYRINGE (ML) INTRAVENOUS ONCE
Refills: 0 | Status: DISCONTINUED | OUTPATIENT
Start: 2023-05-18 | End: 2023-05-21

## 2023-05-18 RX ORDER — AMLODIPINE BESYLATE 2.5 MG/1
10 TABLET ORAL DAILY
Refills: 0 | Status: DISCONTINUED | OUTPATIENT
Start: 2023-05-20 | End: 2023-05-20

## 2023-05-18 RX ORDER — ASPIRIN/CALCIUM CARB/MAGNESIUM 324 MG
81 TABLET ORAL DAILY
Refills: 0 | Status: DISCONTINUED | OUTPATIENT
Start: 2023-05-19 | End: 2023-05-21

## 2023-05-18 RX ORDER — DEXTROSE 50 % IN WATER 50 %
15 SYRINGE (ML) INTRAVENOUS ONCE
Refills: 0 | Status: DISCONTINUED | OUTPATIENT
Start: 2023-05-18 | End: 2023-05-21

## 2023-05-18 RX ORDER — CLOPIDOGREL BISULFATE 75 MG/1
75 TABLET, FILM COATED ORAL DAILY
Refills: 0 | Status: DISCONTINUED | OUTPATIENT
Start: 2023-05-19 | End: 2023-05-21

## 2023-05-18 RX ORDER — HYDROMORPHONE HYDROCHLORIDE 2 MG/ML
4 INJECTION INTRAMUSCULAR; INTRAVENOUS; SUBCUTANEOUS
Refills: 0 | Status: DISCONTINUED | OUTPATIENT
Start: 2023-05-18 | End: 2023-05-21

## 2023-05-18 RX ADMIN — MUPIROCIN 1 APPLICATION(S): 20 OINTMENT TOPICAL at 05:21

## 2023-05-18 RX ADMIN — Medication 975 MILLIGRAM(S): at 21:58

## 2023-05-18 RX ADMIN — LOSARTAN POTASSIUM 25 MILLIGRAM(S): 100 TABLET, FILM COATED ORAL at 05:19

## 2023-05-18 RX ADMIN — OXYCODONE HYDROCHLORIDE 10 MILLIGRAM(S): 5 TABLET ORAL at 10:45

## 2023-05-18 RX ADMIN — OXYCODONE HYDROCHLORIDE 10 MILLIGRAM(S): 5 TABLET ORAL at 10:00

## 2023-05-18 RX ADMIN — SENNA PLUS 2 TABLET(S): 8.6 TABLET ORAL at 21:58

## 2023-05-18 RX ADMIN — Medication 975 MILLIGRAM(S): at 22:38

## 2023-05-18 RX ADMIN — SODIUM CHLORIDE 75 MILLILITER(S): 9 INJECTION, SOLUTION INTRAVENOUS at 23:38

## 2023-05-18 RX ADMIN — AMLODIPINE BESYLATE 10 MILLIGRAM(S): 2.5 TABLET ORAL at 05:19

## 2023-05-18 RX ADMIN — CARVEDILOL PHOSPHATE 3.12 MILLIGRAM(S): 80 CAPSULE, EXTENDED RELEASE ORAL at 05:20

## 2023-05-18 RX ADMIN — OXYCODONE HYDROCHLORIDE 10 MILLIGRAM(S): 5 TABLET ORAL at 04:29

## 2023-05-18 RX ADMIN — ATORVASTATIN CALCIUM 40 MILLIGRAM(S): 80 TABLET, FILM COATED ORAL at 21:58

## 2023-05-18 RX ADMIN — HYDROMORPHONE HYDROCHLORIDE 0.5 MILLIGRAM(S): 2 INJECTION INTRAMUSCULAR; INTRAVENOUS; SUBCUTANEOUS at 15:20

## 2023-05-18 RX ADMIN — Medication 300 MILLIGRAM(S): at 09:55

## 2023-05-18 RX ADMIN — CARVEDILOL PHOSPHATE 3.12 MILLIGRAM(S): 80 CAPSULE, EXTENDED RELEASE ORAL at 17:59

## 2023-05-18 RX ADMIN — Medication 20 MILLIEQUIVALENT(S): at 17:59

## 2023-05-18 RX ADMIN — Medication 2000 MILLIGRAM(S): at 21:58

## 2023-05-18 RX ADMIN — CHLORHEXIDINE GLUCONATE 1 APPLICATION(S): 213 SOLUTION TOPICAL at 06:03

## 2023-05-18 NOTE — CONSULT NOTE ADULT - SUBJECTIVE AND OBJECTIVE BOX
CC: left femur fracture (17 May 2023 21:45)    HPI: 73 yo male with pmhx HTN, HLD, T2DM, CAD s/p PCI presenting to the ED after a mechanical fall with severe LLE pain. He started he tripped on stairs in front of his home. He ambulates independently at baseline. Patient denies any preceding symptoms such as dizziness, chest pain, dyspnea. Denies head strike or LOC, patient can recall the entire event.     ROS: All negative other than as documented above    Pmhx: HTN, HLD, T2DM, CAD  Pshx: Hernia Repair, PCI  Famhx: No family hx of heart disease  SocHx: Denies smoking hx, denies alcohol or illicit drug use  Allergies: NKDA    Vital Signs Last 24 Hrs  T(C): 36.7 (17 May 2023 23:30), Max: 36.9 (17 May 2023 15:35)  T(F): 98.1 (17 May 2023 23:30), Max: 98.4 (17 May 2023 15:35)  HR: 81 (17 May 2023 23:30) (81 - 92)  BP: 126/74 (17 May 2023 23:30) (126/74 - 156/73)  BP(mean): 99 (17 May 2023 20:27) (99 - 99)  RR: 18 (17 May 2023 23:30) (18 - 18)  SpO2: 97% (17 May 2023 23:30) (96% - 97%)    Parameters below as of 17 May 2023 23:30  Patient On (Oxygen Delivery Method): room air    PHYSICAL EXAM:    Vital Signs Last 24 Hrs  T(C): 36.7 (17 May 2023 23:30), Max: 36.9 (17 May 2023 15:35)  T(F): 98.1 (17 May 2023 23:30), Max: 98.4 (17 May 2023 15:35)  HR: 81 (17 May 2023 23:30) (81 - 92)  BP: 126/74 (17 May 2023 23:30) (126/74 - 156/73)  BP(mean): 99 (17 May 2023 20:27) (99 - 99)  RR: 18 (17 May 2023 23:30) (18 - 18)  SpO2: 97% (17 May 2023 23:30) (96% - 97%)    Parameters below as of 17 May 2023 23:30  Patient On (Oxygen Delivery Method): room air    General: Age-appearing, in no acute distress  Head: Normochephalic, atraumatic  ENMT: EOMI, neck supple  Cardiovascular: +S1, S2; Regular rate and rhythm, no murmurs, rubs, gallops  Respiratory: CTA BL, no wheezes, rales, rhonchi  Gastrointestinal: Abdomen soft, non-tender, +BS in all 4 quadrants  Extremities: No clubbing, cyanosis, or edema  Vascular: 2+ pulses, cap refill < 2 seconds  Neuro: Non-focal, AAOx4, sensation intact BL  Musculoskeletal: Normal tone, no deformities  Skin: Warm, dry; no acute rash seen  Psych: Appropriate, cooperative    I&O's Detail                        13.2   7.31  )-----------( 142      ( 17 May 2023 14:20 )             39.7     17 May 2023 14:20    140    |  100    |  21.7   ----------------------------<  107    4.0     |  25.0   |  0.84     Ca    9.3        17 May 2023 14:20    TPro  6.8    /  Alb  4.2    /  TBili  0.4    /  DBili  x      /  AST  30     /  ALT  24     /  AlkPhos  72     17 May 2023 14:20    PT/INR - ( 17 May 2023 14:20 )   PT: 12.9 sec;   INR: 1.11 ratio         PTT - ( 17 May 2023 14:20 )  PTT:33.1 sec  CAPILLARY BLOOD GLUCOSE      POCT Blood Glucose.: 153 mg/dL (17 May 2023 21:22)    LIVER FUNCTIONS - ( 17 May 2023 14:20 )  Alb: 4.2 g/dL / Pro: 6.8 g/dL / ALK PHOS: 72 U/L / ALT: 24 U/L / AST: 30 U/L / GGT: x           MEDICATIONS  (STANDING):  amLODIPine   Tablet 10 milliGRAM(s) Oral daily  atorvastatin 40 milliGRAM(s) Oral at bedtime  carvedilol 3.125 milliGRAM(s) Oral every 12 hours  ceFAZolin  Injectable. 2000 milliGRAM(s) IV Push once  chlorhexidine 4% Liquid 1 Application(s) Topical <User Schedule>  dextrose 5%. 1000 milliLiter(s) (50 mL/Hr) IV Continuous <Continuous>  dextrose 5%. 1000 milliLiter(s) (100 mL/Hr) IV Continuous <Continuous>  dextrose 50% Injectable 12.5 Gram(s) IV Push once  dextrose 50% Injectable 25 Gram(s) IV Push once  dextrose 50% Injectable 25 Gram(s) IV Push once  glucagon  Injectable 1 milliGRAM(s) IntraMuscular once  insulin lispro (ADMELOG) corrective regimen sliding scale   SubCutaneous Before meals and at bedtime  losartan 25 milliGRAM(s) Oral daily  mupirocin 2% Ointment 1 Application(s) Both Nostrils two times a day  povidone iodine 5% Nasal Swab 1 Application(s) Both Nostrils once  tranexamic acid IVPB 1000 milliGRAM(s) IV Intermittent once  tranexamic acid IVPB 1000 milliGRAM(s) IV Intermittent once  tranexamic acid IVPB 1000 milliGRAM(s) IV Intermittent once  vancomycin  IVPB 1500 milliGRAM(s) IV Intermittent once    MEDICATIONS  (PRN):  dextrose Oral Gel 15 Gram(s) Oral once PRN Blood Glucose LESS THAN 70 milliGRAM(s)/deciliter  hydrochlorothiazide 25 milliGRAM(s) Oral daily PRN htn  oxyCODONE    IR 10 milliGRAM(s) Oral every 4 hours PRN Severe Pain (7 - 10)  oxyCODONE    IR 5 milliGRAM(s) Oral every 4 hours PRN Moderate Pain (4 - 6)      RADIOLOGY & ADDITIONAL TESTS:    L femur X-ray showing Femur fracture CC: left femur fracture (17 May 2023 21:45)    HPI: 73 yo male with pmhx HTN, HLD, T2DM, CAD s/p CABGx2 (17 years ago) and PCIx6 (3 years ago) presenting to the ED after a mechanical fall with severe LLE pain. He started he tripped on stairs in front of his home. He ambulates independently at baseline. Patient denies any preceding symptoms such as dizziness, chest pain, dyspnea. Denies head strike or LOC, patient can recall the entire event. He follows with Dr. Younger for Cardiology. Denies history of CHF, no recent anginal sxs.     ROS: All negative other than as documented above    Pmhx: HTN, HLD, T2DM, CAD  Pshx: Hernia Repair, PCIx6, CABGx2  Famhx: Father DM and Lung Ca, Brother MI  SocHx: Former smokers 1ppd x 15 years but quit 25 years ago, denies alcohol or illicit drug use  Allergies: NKDA    PHYSICAL EXAM:    Vital Signs Last 24 Hrs  T(C): 36.7 (17 May 2023 23:30), Max: 36.9 (17 May 2023 15:35)  T(F): 98.1 (17 May 2023 23:30), Max: 98.4 (17 May 2023 15:35)  HR: 81 (17 May 2023 23:30) (81 - 92)  BP: 126/74 (17 May 2023 23:30) (126/74 - 156/73)  BP(mean): 99 (17 May 2023 20:27) (99 - 99)  RR: 18 (17 May 2023 23:30) (18 - 18)  SpO2: 97% (17 May 2023 23:30) (96% - 97%)    Parameters below as of 17 May 2023 23:30  Patient On (Oxygen Delivery Method): room air    General: Age-appearing, in no acute distress  Head: Normocephalic atraumatic  ENMT: EOMI, neck supple  Cardiovascular: +S1, S2; Regular rate and rhythm, no murmurs, rubs, gallops  Respiratory: CTA BL, no wheezes, rales, rhonchi  Gastrointestinal: Abdomen soft, non-tender, +BS in all 4 quadrants  Extremities: No clubbing, cyanosis, or edema  Vascular: 2+ pulses, cap refill < 2 seconds  Neuro: Non-focal, AAOx4, sensation intact BL  Musculoskeletal: Normal tone, no deformities  Skin: Warm, dry; no acute rash seen  Psych: Appropriate, cooperative    I&O's Detail                        13.2   7.31  )-----------( 142      ( 17 May 2023 14:20 )             39.7     17 May 2023 14:20    140    |  100    |  21.7   ----------------------------<  107    4.0     |  25.0   |  0.84     Ca    9.3        17 May 2023 14:20    TPro  6.8    /  Alb  4.2    /  TBili  0.4    /  DBili  x      /  AST  30     /  ALT  24     /  AlkPhos  72     17 May 2023 14:20    PT/INR - ( 17 May 2023 14:20 )   PT: 12.9 sec;   INR: 1.11 ratio         PTT - ( 17 May 2023 14:20 )  PTT:33.1 sec  CAPILLARY BLOOD GLUCOSE      POCT Blood Glucose.: 153 mg/dL (17 May 2023 21:22)    LIVER FUNCTIONS - ( 17 May 2023 14:20 )  Alb: 4.2 g/dL / Pro: 6.8 g/dL / ALK PHOS: 72 U/L / ALT: 24 U/L / AST: 30 U/L / GGT: x           MEDICATIONS  (STANDING):  amLODIPine   Tablet 10 milliGRAM(s) Oral daily  atorvastatin 40 milliGRAM(s) Oral at bedtime  carvedilol 3.125 milliGRAM(s) Oral every 12 hours  ceFAZolin  Injectable. 2000 milliGRAM(s) IV Push once  chlorhexidine 4% Liquid 1 Application(s) Topical <User Schedule>  dextrose 5%. 1000 milliLiter(s) (50 mL/Hr) IV Continuous <Continuous>  dextrose 5%. 1000 milliLiter(s) (100 mL/Hr) IV Continuous <Continuous>  dextrose 50% Injectable 12.5 Gram(s) IV Push once  dextrose 50% Injectable 25 Gram(s) IV Push once  dextrose 50% Injectable 25 Gram(s) IV Push once  glucagon  Injectable 1 milliGRAM(s) IntraMuscular once  insulin lispro (ADMELOG) corrective regimen sliding scale   SubCutaneous Before meals and at bedtime  losartan 25 milliGRAM(s) Oral daily  mupirocin 2% Ointment 1 Application(s) Both Nostrils two times a day  povidone iodine 5% Nasal Swab 1 Application(s) Both Nostrils once  tranexamic acid IVPB 1000 milliGRAM(s) IV Intermittent once  tranexamic acid IVPB 1000 milliGRAM(s) IV Intermittent once  tranexamic acid IVPB 1000 milliGRAM(s) IV Intermittent once  vancomycin  IVPB 1500 milliGRAM(s) IV Intermittent once    MEDICATIONS  (PRN):  dextrose Oral Gel 15 Gram(s) Oral once PRN Blood Glucose LESS THAN 70 milliGRAM(s)/deciliter  hydrochlorothiazide 25 milliGRAM(s) Oral daily PRN htn  oxyCODONE    IR 10 milliGRAM(s) Oral every 4 hours PRN Severe Pain (7 - 10)  oxyCODONE    IR 5 milliGRAM(s) Oral every 4 hours PRN Moderate Pain (4 - 6)      RADIOLOGY & ADDITIONAL TESTS:    L femur X-ray showing Femur fracture

## 2023-05-18 NOTE — DISCHARGE NOTE PROVIDER - NSDCFUADDINST_GEN_ALL_CORE_FT
The patient will be seen in the office between 2-3 weeks for wound check. Sutures/Staples/Tape will be removed at that time. Patient may shower after post-op day #5. The dressing is to be removed on day #10. The patient will contact the office if the wound becomes red, has increasing pain, develops bleeding or discharge, odor or an injury occurs, or has other concerns. Inflammation to incision can cause pain and you can apply ice to site for 20 mins every 3-4 hours with elevating leg.    If there is a need for Physical Therapy this will be arranged for at home therapy.  You may recieve equipment to help assist in your mobility which may include a rolling walker, commode, cane/crutches, and/or raised toilet seat. The patient will continue LOVENOX for 4 weeks for DVTP. The patient will take oxycodone and tylenol for pain control and titrate according to prescription and patient needs.  While on pain medications patient will continue senna to prevent constipation. The patient is FULL weight bearing. Patient is not to drive until cleared by surgeon. The patient will be seen in the office between 2-3 weeks for wound check. Sutures/Staples/Tape will be removed at that time. Patient may shower after post-op day #5. The dressing is to be removed on day #10. The patient will contact the office if the wound becomes red, has increasing pain, develops bleeding or discharge, odor or an injury occurs, or has other concerns. Inflammation to incision can cause pain and you can apply ice to site for 20 mins every 3-4 hours with elevating leg.    If there is a need for Physical Therapy this will be arranged for at home therapy.  You may recieve equipment to help assist in your mobility which may include a rolling walker, commode, cane/crutches, and/or raised toilet seat. The patient will continue PLAVIX and ASPIRIN as prescribed at home for DVTP. The patient will take oxycodone and tylenol for pain control and titrate according to prescription and patient needs.  While on pain medications patient will continue senna to prevent constipation. The patient is FULL weight bearing. Patient is not to drive until cleared by surgeon. The patient will be seen in the Orthopedic office to arrange post operative appt to be seen between 2-3 weeks . Patient may shower after post-op day #5. The dressing is to be removed on day #10. The patient will contact the office if the wound becomes red, has increasing pain, develops bleeding or discharge, odor or an injury occurs, or has other concerns. Inflammation to incision can cause pain and you can apply ice to site for 20 mins every 3-4 hours with elevating leg.    If there is a need for Physical Therapy this will be arranged for at home therapy.  You may receive equipment to help assist in your mobility which may include a rolling walker, commode, cane/crutches, and/or raised toilet seat. The patient will continue PLAVIX and ASPIRIN as prescribed at home for DVTP. The patient will take oxycodone and tylenol for pain control and titrate according to prescription and patient needs.  While on pain medications patient will continue senna to prevent constipation. The patient is FULL weight bearing. Patient is not to drive until cleared by surgeon.

## 2023-05-18 NOTE — DISCHARGE NOTE PROVIDER - PROVIDER TOKENS
PROVIDER:[TOKEN:[[id number]]] normal rate and rhythm, no chest pain and no edema. PROVIDER:[TOKEN:[[id number]]],PROVIDER:[TOKEN:[[id number]]]

## 2023-05-18 NOTE — BRIEF OPERATIVE NOTE - COMMENTS
Post-op Plan: WBAT, PT/OT, ancef per protocol, contralateral SCD, LMWH (or equivalent) x 4 weeks post-op, f/u ortho 3 weeks for wound check and x-rays of femur

## 2023-05-18 NOTE — DISCHARGE NOTE PROVIDER - NSDCCPTREATMENT_GEN_ALL_CORE_FT
PRINCIPAL PROCEDURE  Procedure: Intramedullary nail fixation of left femur  Findings and Treatment:

## 2023-05-18 NOTE — ASU PREOP CHECKLIST - IV STARTED
Telephone Encounter by Cathy Bruce at 06/13/18 04:07 PM     Author:  Cathy Bruce Service:  (none) Author Type:  Patient      Filed:  06/13/18 04:10 PM Encounter Date:  6/13/2018 Status:  Signed     :  Cathy Bruce (Patient )            Questionnaire for New Patient Scheduling    Verify insurance with patient:[RR1.1T]  [employer]MO[RR1.1M]    Does patient have insurance from the state?[RR1.1T] Yes   Patient advised that we do not take Medicaid other than [place] patients who see an [facility] PCP and only for MD/DO appointments: Yes[RR1.1M]    Appointment Scheduling Questions:   Intake completed by:[RR1.1T] Name: MELANIE  Relationship to patient: AUNT/GUARDIAN[RR1.1M]    Would you briefly tell me the primary reason that you need an appointment?[RR1.1T] SAW JAMILAH [facility]AD[facility] IN April, WANTS TO SEE IF THERE IS MORE GOING ON THAN JUST ADHD, AND TO CONTINUE MEDICATION MANAGEMENT[RR1.1M]    Symptom Questions Relating to Needing Triage:  Are you currently having...  Any thoughts of harming yourself?[RR1.1T] no[RR1.1M]  Any thoughts of harming others?[RR1.1T] no[RR1.1M]  Hallucinations?[RR1.1T] no[RR1.1M]  For female patients ages 12-53: Are you currently pregnant or have you recently given birth in the last two months?[RR1.1T] no[RR1.1M]  IF ANSWER YES TO ANY- transfer for triage immediately  For [facility] calls Adult  Child   For [facility]     Any self-injury or cutting issues?[RR1.1T] no[RR1.1M]    Have you (your child) been previously diagnosed with a mental health condition?[RR1.1T] no[RR1.1M]    Did anyone refer you for treatment?[RR1.1T] PHILIP RENE[RR1.1M]    Is this appointment request related to a hospitalization?[RR1.1T] No[RR1.1M]    Are you interested in scheduling with a provider who can prescribe medication?[RR1.1T] Yes[RR1.1M]    Our doctors recommend talk therapy for most patients; Are  you interested in scheduling with a talk therapist?[RR1.1T] No[RR1.1M]   Issues with restricting or bingeing on food?[RR1.1T] no[RR1.1M]    Ages 10+: Issues with use of:  Alcohol?[RR1.1T] no[RR1.1M]  Recreational substances?[RR1.1T] no[RR1.1M]  Problems with other addictive behaviors (e.g. gambling, sexual issues, etc.)?[RR1.1T] no[RR1.1M]    Have you seen a Psychiatrist outside of [facility] within the past year:[RR1.1T] No[RR1.1M]    Patient notified to bring a current medication list (including both over-the-counter and prescription medications):[RR1.1T] Yes[RR1.1M]    Patient/Parent advised that, if they feel unsafe, they have the option of calling 911 or going to nearest hospital, without needing provider's permission?[RR1.1T] Yes[RR1.1M]    Advised patient that [facility] in [place] (phone number [phone]) is [facility] preferred Adult Inpatient Unit (24 hour help line). [facility] in [place] (phone number [phone]) is preferred for children.[RR1.1T] Yes[RR1.1M]    Patient notified to contact the number on the back of their insurance card to inquire as to what their benefits are for behavioral/mental health services and check if precertification needed:[RR1.1T] Yes[RR1.1M]    Tax ID ([phone]) given to patient to check insurance benefits:[RR1.1T] Yes[RR1.1M]    Patient notified that failure to check benefits may mean visits are not covered:[RR1.1T] Yes[RR1.1M]    Patient notified that if they have family member being treated by our providers, they need to schedule with a different provider than that family member (except for Child Psychiatrists):[RR1.1T] Yes[RR1.1M]    Is patient under 18 years old?[RR1.1T] No[RR1.1M]    Contact number for patient:[RR1.1T] [phone][RR1.1C]    Appointment scheduled for:[RR1.1T] 6/18/18[RR1.1M]    Does the patient want to be on the Wait List?[RR1.1T] No[RR1.1M]        Revision History        User Key Date/Time User Provider Type Action    > RR1.1  06/13/18 04:10 PM Cathy Bruce Patient  Sign    C - Copied, M - Manual, T - Template             yes

## 2023-05-18 NOTE — DISCHARGE NOTE PROVIDER - NSDCCPCAREPLAN_GEN_ALL_CORE_FT
PRINCIPAL DISCHARGE DIAGNOSIS  Diagnosis: Fracture, proximal femur  Assessment and Plan of Treatment:

## 2023-05-18 NOTE — DISCHARGE NOTE PROVIDER - HOSPITAL COURSE
The patient was admitted for left femur fracture on 5/17/23. The patient was seen by cardiology pre-operatively and cleared for surgery. Patient underwent ORIF of left femur on 5/1/23. The patient had no post-operative complications. Pain was well controlled and patient participated in physical therapy. Patient was discharged in stable condition.

## 2023-05-18 NOTE — DISCHARGE NOTE PROVIDER - CARE PROVIDERS DIRECT ADDRESSES
,[email or web address]

## 2023-05-18 NOTE — BRIEF OPERATIVE NOTE - NSICDXBRIEFPOSTOP_GEN_ALL_CORE_FT
POST-OP DIAGNOSIS:  Displaced spiral fracture of shaft of left femur 18-May-2023 13:57:35  Emre Jamil

## 2023-05-18 NOTE — CONSULT NOTE ADULT - ASSESSMENT
71 yo male with pmhx HTN, HLD, T2DM, CAD s/p PCI/CABG presenting to the ED after a mechanical fall with severe LLE pain.    Acute L Femur Fracture s/p Mechanical Fall  -Admitted to orthopedics service with plan for OR in am  -Keep NPO, bedrest  -EKG NSR @ 85 bpm, incomplete RBBB, LVH; no prior EKG for comparison  -CXR without acute chest disease  -ALMENDAREZ 0.2 % ERAN risk  -RCRI 1; Class II risk  -Patient is optimized for the moderate risk procedure without modifiable risk factors     CAD s/p PCI, HTN, HLD  -Continue Atorvastatin 10 mg qhs  -Continue Losartan 25 mg daily and Norvasc 10 mg daily    T2DM  -Hold PO meds while hospitalized  -ISS/Accuchecks/A1c  -Carb controlled diet once patient can eat post-op    DVTppx:  71 yo male with pmhx HTN, HLD, T2DM, CAD s/p PCI/CABG presenting to the ED after a mechanical fall with severe LLE pain.    Acute L Femur Fracture s/p Mechanical Fall  -Admitted to orthopedics service with plan for OR in am  -Keep NPO, bedrest  -EKG NSR @ 85 bpm, incomplete RBBB, LVH; no prior EKG for comparison; no active chest pain or cardiac sxs  -CXR without acute chest disease  -ALMENDAREZ 0.2 % ERAN risk  -RCRI 1; Class II risk, METs >4   -Patient is optimized for the moderate risk procedure without modifiable risk factors     CAD s/p PCI, HTN, HLD  -Continue Atorvastatin 10 mg qhs  -Continue Losartan 25 mg daily, Norvasc 10 mg daily, Coreg 3.125 mg BID  -Patient takes HCTZ 25 mg as PRN for LE swelling, can hold for now  -Resume DAPT Aspirin and Plavix as soon after procedure as possible per ortho     T2DM  -Hold PO meds while hospitalized  -ISS/Accuchecks/A1c  -Carb controlled diet once patient can eat post-op    DVTppx: On hold for OR

## 2023-05-18 NOTE — CONSULT NOTE ADULT - SUBJECTIVE AND OBJECTIVE BOX
[facility]                                              CARDIOLOGY AT [address], [place] [address]                                             Telephone: [phone]. Fax:[phone]      CARDIOLOGY CONSULTATION NOTE                                                                                             History obtained by: Patient and medical record  Community Cardiologist: Dr Younger from [facility]    obtained: No   Reason for Consultation: Cardiac clearance     Chief complaint:   Patient is a 72y old  Male who presents with a chief complaint of left femur fracture (18 May 2023 00:07)    HPI: 71 yo male with pmhx HTN, HLD, T2DM, CAD s/p CABG x 2 (17 years ago) and s/p PCI x 6 (3 years ago last PCI) presenting to the ED after a mechanical fall with severe LLE pain. He started he tripped on stairs in front of his home. He ambulates independently at baseline. Patient denies any preceding symptoms such as dizziness, chest pain, dyspnea. Denies head strike or LOC, patient can recall the entire event. He follows with Dr. Younger for Cardiology. Denies history of CHF, no recent anginal sxs.     CARDIAC TESTING     PAST MEDICAL HISTORY  CAD (coronary artery disease)  Type 2 diabetes mellitus  Hypertension  Hyperlipidemia    PAST SURGICAL HISTORY  S/P cardiac catheterization  S/P coronary artery stent placement  Fracture of right lower leg  S/P hernia repair    SOCIAL HISTORY:  Former smokers 1ppd x 15 years but quit 25 years ago. Denies alcohol/drugs    FAMILY HISTORY:  Brother MI passed at 54 yo    Family History of Cardiovascular Disease:  Yes     Coronary Artery Disease in first degree relative: Yes    Sudden Cardiac Death in First degree relative: No     HOME MEDICATIONS:   amLODIPine 10 mg oral tablet: 1 orally once a day HOLD for BP &lt;110 and or HR &lt;60 (17 May 2023 18:46)  aspirin 81 mg oral tablet: 1 orally once a day (17 May 2023 18:44)  atorvastatin 40 mg oral tablet: 1 orally once a day (17 May 2023 18:52)  carvedilol 3.125 mg oral tablet: 1 orally every 12 hours HOLD for BP&lt;110 and or HR &lt;60 (17 May 2023 18:52)  GlipiZIDE XL 2.5 mg oral tablet, extended release: 1 orally 2 times a day (17 May 2023 18:47)  hydroCHLOROthiazide 25 mg oral tablet: 1 orally once a day as needed for htn (17 May 2023 18:52)  Klor-Con M20 oral tablet, extended release: 1 orally once a day (17 May 2023 18:52)  losartan 25 mg oral tablet: 1 orally once a day HOLD for BP &lt;110 (17 May 2023 18:46)  pioglitazone-metformin 15 mg-500 mg oral tablet: 1 orally 2 times a day (17 May 2023 18:52)  Plavix 75 mg oral tablet: 1 orally once a day (17 May 2023 18:44)  Vascepa 1 g oral capsule: 2 orally once a day (17 May 2023 18:52)  Welchol 625 mg oral tablet: 3 orally 2 times a day (17 May 2023 18:52)    CURRENT CARDIAC MEDICATIONS:   amLODIPine   Tablet 10 milliGRAM(s) Oral daily  carvedilol 3.125 milliGRAM(s) Oral every 12 hours  hydrochlorothiazide 25 milliGRAM(s) Oral daily PRN htn  losartan 25 milliGRAM(s) Oral daily    CURRENT OTHER MEDICATIONS:   oxyCODONE    IR 10 milliGRAM(s) Oral every 4 hours PRN Severe Pain (7 - 10)  oxyCODONE    IR 5 milliGRAM(s) Oral every 4 hours PRN Moderate Pain (4 - 6)  atorvastatin 40 milliGRAM(s) Oral at bedtime  ceFAZolin  Injectable. 2000 milliGRAM(s) IV Push once, Stop order after: 1 Doses  chlorhexidine 4% Liquid 1 Application(s) Topical <User Schedule>  dextrose 5%. 1000 milliLiter(s) (50 mL/Hr) IV Continuous <Continuous>  dextrose 5%. 1000 milliLiter(s) (100 mL/Hr) IV Continuous <Continuous>  dextrose 50% Injectable 12.5 Gram(s) IV Push once, Stop order after: 1 Doses  dextrose 50% Injectable 25 Gram(s) IV Push once, Stop order after: 1 Doses  dextrose 50% Injectable 25 Gram(s) IV Push once, Stop order after: 1 Doses  dextrose Oral Gel 15 Gram(s) Oral once, Stop order after: 1 Doses PRN Blood Glucose LESS THAN 70 milliGRAM(s)/deciliter  glucagon  Injectable 1 milliGRAM(s) IntraMuscular once, Stop order after: 1 Doses  insulin lispro (ADMELOG) corrective regimen sliding scale   SubCutaneous Before meals and at bedtime  mupirocin 2% Ointment 1 Application(s) Both Nostrils two times a day, Stop order after: 5 Days  povidone iodine 5% Nasal Swab 1 Application(s) Both Nostrils once, Stop order after: 1 Doses  tranexamic acid IVPB 1000 milliGRAM(s) IV Intermittent once, Stop order after: 1 Doses  tranexamic acid IVPB 1000 milliGRAM(s) IV Intermittent once, Stop order after: 1 Doses  tranexamic acid IVPB 1000 milliGRAM(s) IV Intermittent once, Stop order after: 1 Doses  vancomycin  IVPB 1500 milliGRAM(s) IV Intermittent once, Stop order after: 1 Doses    ALLERGIES:   No Known Allergies    REVIEW OF SYMPTOMS:   CONSTITUTIONAL: No fever, no chills, no weight loss, no weight gain, no fatigue   ENMT:  No vertigo; No sinus or throat pain  NECK: No pain or stiffness  CARDIOVASCULAR: No chest pain, no dyspnea, no syncope/presyncope, no fatigue, no palpitations, no dizziness, no Orthopnea, no Paroxsymal nocturnal dyspnea  RESPIRATORY: No shortness of breath, no cough  : No dysuria, no hematuria   GI: no nausea, no diarrhea, no constipation, no abdominal pain  NEURO: No headache, no slurred speech   MUSCULOSKELETAL: LLE +pain   PSYCH: No agitation, no anxiety.    ALL OTHER REVIEW OF SYSTEMS ARE NEGATIVE.    VITAL SIGNS:   T(C): 36.7 (05-17-23 @ 23:30), Max: 36.9 (05-17-23 @ 15:35)  T(F): 98.1 (05-17-23 @ 23:30), Max: 98.4 (05-17-23 @ 15:35)  HR: 81 (05-17-23 @ 23:30) (81 - 92)  BP: 126/74 (05-17-23 @ 23:30) (126/74 - 156/73)  RR: 18 (05-17-23 @ 23:30) (18 - 18)  SpO2: 97% (05-17-23 @ 23:30) (96% - 97%)    PHYSICAL EXAM:   Constitutional: Comfortable . No acute distress.   HEENT: Atraumatic and normocephalic , neck is supple . no JVD.   CNS: A&Ox3. No focal deficits.   Respiratory: CTAB, unlabored. No wheezing, No rhonchi. No crackles   Cardiovascular: RRR normal s1 s2. No murmur. No rubs or gallop.  Gastrointestinal: Soft, non-tender. +Bowel sounds.   Extremities: 2+ Peripheral Pulses, No edema  Psychiatric: Calm . no agitation.   Skin: Warm and dry    LABS:                13.2   7.31  )-----------( 142      ( 17 May 2023 14:20 )             39.7     05-17    x   |  x   |  x   ----------------------------<  144<H>  x    |  x   |  x     Ca    9.3      17 May 2023 14:20    TPro  6.8  /  Alb  4.2  /  TBili  0.4  /  DBili  x   /  AST  30  /  ALT  24  /  AlkPhos  72  05-17    PT/INR - ( 17 May 2023 14:20 )   PT: 12.9 sec;   INR: 1.11 ratio      PTT - ( 17 May 2023 14:20 )  PTT:33.1 sec    ECG:   Prior ECG: Yes    RADIOLOGY & ADDITIONAL STUDIES:       Preliminary evaluation, please await official recommendations     Assessment and recommendations are final when note is signed by the attending.                                      [facility]                                              CARDIOLOGY AT [address], [place] [address]                                             Telephone: [phone]. Fax:[phone]      CARDIOLOGY CONSULTATION NOTE                                                                                             History obtained by: Patient and medical record  Community Cardiologist: Dr Younger from [facility]    obtained: No   Reason for Consultation: Cardiac clearance     Chief complaint:   Patient is a 72y old  Male who presents with a chief complaint of left femur fracture (18 May 2023 00:07)    HPI: 73 yo male with pmhx HTN, HLD, T2DM, CAD s/p CABG x 2 (17 years ago) and s/p PCI x 6 (3 years ago last PCI) presenting to the ED after a mechanical fall with severe LLE pain. He started he tripped on stairs in front of his home. He ambulates independently at baseline. Patient denies any preceding symptoms such as dizziness, chest pain, dyspnea. Denies head strike or LOC, patient can recall the entire event. He follows with Dr. Younger for Cardiology. Denies history of CHF, no recent anginal sxs.     CARDIAC TESTING     PAST MEDICAL HISTORY  CAD (coronary artery disease)  Type 2 diabetes mellitus  Hypertension  Hyperlipidemia    PAST SURGICAL HISTORY  S/P cardiac catheterization  S/P coronary artery stent placement  Fracture of right lower leg  S/P hernia repair    SOCIAL HISTORY:  Former smokers 1ppd x 15 years but quit 25 years ago. Denies alcohol/drugs    FAMILY HISTORY:  Brother MI passed at 54 yo    Family History of Cardiovascular Disease:  Yes     Coronary Artery Disease in first degree relative: Yes    Sudden Cardiac Death in First degree relative: No     HOME MEDICATIONS:   amLODIPine 10 mg oral tablet: 1 orally once a day HOLD for BP &lt;110 and or HR &lt;60 (17 May 2023 18:46)  aspirin 81 mg oral tablet: 1 orally once a day (17 May 2023 18:44)  atorvastatin 40 mg oral tablet: 1 orally once a day (17 May 2023 18:52)  carvedilol 3.125 mg oral tablet: 1 orally every 12 hours HOLD for BP&lt;110 and or HR &lt;60 (17 May 2023 18:52)  GlipiZIDE XL 2.5 mg oral tablet, extended release: 1 orally 2 times a day (17 May 2023 18:47)  hydroCHLOROthiazide 25 mg oral tablet: 1 orally once a day as needed for htn (17 May 2023 18:52)  Klor-Con M20 oral tablet, extended release: 1 orally once a day (17 May 2023 18:52)  losartan 25 mg oral tablet: 1 orally once a day HOLD for BP &lt;110 (17 May 2023 18:46)  pioglitazone-metformin 15 mg-500 mg oral tablet: 1 orally 2 times a day (17 May 2023 18:52)  Plavix 75 mg oral tablet: 1 orally once a day (17 May 2023 18:44)  Vascepa 1 g oral capsule: 2 orally once a day (17 May 2023 18:52)  Welchol 625 mg oral tablet: 3 orally 2 times a day (17 May 2023 18:52)    CURRENT CARDIAC MEDICATIONS:   amLODIPine   Tablet 10 milliGRAM(s) Oral daily  carvedilol 3.125 milliGRAM(s) Oral every 12 hours  hydrochlorothiazide 25 milliGRAM(s) Oral daily PRN htn  losartan 25 milliGRAM(s) Oral daily    CURRENT OTHER MEDICATIONS:   oxyCODONE    IR 10 milliGRAM(s) Oral every 4 hours PRN Severe Pain (7 - 10)  oxyCODONE    IR 5 milliGRAM(s) Oral every 4 hours PRN Moderate Pain (4 - 6)  atorvastatin 40 milliGRAM(s) Oral at bedtime  ceFAZolin  Injectable. 2000 milliGRAM(s) IV Push once, Stop order after: 1 Doses  chlorhexidine 4% Liquid 1 Application(s) Topical <User Schedule>  dextrose 5%. 1000 milliLiter(s) (50 mL/Hr) IV Continuous <Continuous>  dextrose 5%. 1000 milliLiter(s) (100 mL/Hr) IV Continuous <Continuous>  dextrose 50% Injectable 12.5 Gram(s) IV Push once, Stop order after: 1 Doses  dextrose 50% Injectable 25 Gram(s) IV Push once, Stop order after: 1 Doses  dextrose 50% Injectable 25 Gram(s) IV Push once, Stop order after: 1 Doses  dextrose Oral Gel 15 Gram(s) Oral once, Stop order after: 1 Doses PRN Blood Glucose LESS THAN 70 milliGRAM(s)/deciliter  glucagon  Injectable 1 milliGRAM(s) IntraMuscular once, Stop order after: 1 Doses  insulin lispro (ADMELOG) corrective regimen sliding scale   SubCutaneous Before meals and at bedtime  mupirocin 2% Ointment 1 Application(s) Both Nostrils two times a day, Stop order after: 5 Days  povidone iodine 5% Nasal Swab 1 Application(s) Both Nostrils once, Stop order after: 1 Doses  tranexamic acid IVPB 1000 milliGRAM(s) IV Intermittent once, Stop order after: 1 Doses  tranexamic acid IVPB 1000 milliGRAM(s) IV Intermittent once, Stop order after: 1 Doses  tranexamic acid IVPB 1000 milliGRAM(s) IV Intermittent once, Stop order after: 1 Doses  vancomycin  IVPB 1500 milliGRAM(s) IV Intermittent once, Stop order after: 1 Doses    ALLERGIES:   No Known Allergies    REVIEW OF SYMPTOMS:   CONSTITUTIONAL: No fever, no chills, no weight loss, no weight gain, no fatigue   ENMT:  No vertigo; No sinus or throat pain  NECK: No pain or stiffness  CARDIOVASCULAR: No chest pain, no dyspnea, no syncope/presyncope, no fatigue, no palpitations, no dizziness, no Orthopnea, no Paroxsymal nocturnal dyspnea  RESPIRATORY: No shortness of breath, no cough  : No dysuria, no hematuria   GI: no nausea, no diarrhea, no constipation, no abdominal pain  NEURO: No headache, no slurred speech   MUSCULOSKELETAL: LLE +pain   PSYCH: No agitation, no anxiety.    ALL OTHER REVIEW OF SYSTEMS ARE NEGATIVE.    VITAL SIGNS:   T(C): 36.7 (05-17-23 @ 23:30), Max: 36.9 (05-17-23 @ 15:35)  T(F): 98.1 (05-17-23 @ 23:30), Max: 98.4 (05-17-23 @ 15:35)  HR: 81 (05-17-23 @ 23:30) (81 - 92)  BP: 126/74 (05-17-23 @ 23:30) (126/74 - 156/73)  RR: 18 (05-17-23 @ 23:30) (18 - 18)  SpO2: 97% (05-17-23 @ 23:30) (96% - 97%)    PHYSICAL EXAM:   Constitutional: Comfortable . No acute distress.   HEENT: Atraumatic and normocephalic , neck is supple . no JVD.   CNS: A&Ox3. No focal deficits.   Respiratory: CTAB, unlabored. No wheezing, No rhonchi. No crackles   Cardiovascular: RRR normal s1 s2. No murmur. No rubs or gallop.  Gastrointestinal: Soft, non-tender. +Bowel sounds.   Extremities: 2+ Peripheral Pulses, No edema  Psychiatric: Calm . no agitation.   Skin: Warm and dry    LABS:                13.2   7.31  )-----------( 142      ( 17 May 2023 14:20 )             39.7     05-17    x   |  x   |  x   ----------------------------<  144<H>  x    |  x   |  x     Ca    9.3      17 May 2023 14:20    TPro  6.8  /  Alb  4.2  /  TBili  0.4  /  DBili  x   /  AST  30  /  ALT  24  /  AlkPhos  72  05-17    PT/INR - ( 17 May 2023 14:20 )   PT: 12.9 sec;   INR: 1.11 ratio      PTT - ( 17 May 2023 14:20 )  PTT:33.1 sec    ECG: NSR with incomplete RBBB and Q waves (old MI) in anterior and inferior leads which are new compared to EKG from 2013  Prior ECG: Yes    RADIOLOGY & ADDITIONAL STUDIES:       Preliminary evaluation, please await official recommendations     Assessment and recommendations are final when note is signed by the attending.

## 2023-05-18 NOTE — CONSULT NOTE ADULT - NS ATTEND AMEND GEN_ALL_CORE FT
HPI: 71 yo male with pmhx HTN, HLD, T2DM, CAD s/p CABG x 2 (17 years ago) and s/p PCI x 6 (3 years ago last PCI) presenting to the ED after a mechanical fall with severe LLE pain and sustained Fracture of femur, left, closed.     Preoperative Cardiovascular examination  fx of L femur  Plan is for OR   Pt will need echo prior to surgery  Pt is currently without angina, dyspnea, syncope, or palpitations however pt has hx of HTN, HLD and CAD sp CABG x 2 (17 years ago) and s/p PCI x 6 (3 years ago last PCI)  EKG shows NSR with incomplete RBBB and Q waves (old MI) in anterior and inferior leads which are new compared to EKG from 2013  RCRI 2 points Class III Risk 10.1 % 30-day risk of death, MI, or cardiac arrest  Prabhakar 0.2 % Risk of myocardial infarction or cardiac arrest, intraoperatively or up to 30 days post-op  Mets > 4 Pt ambulated independently at baseline prior to fall
[phone]

## 2023-05-18 NOTE — DISCHARGE NOTE PROVIDER - NSDCMRMEDTOKEN_GEN_ALL_CORE_FT
amLODIPine 10 mg oral tablet: 1 orally once a day HOLD for BP &lt;110 and or HR &lt;60  aspirin 81 mg oral tablet: 1 orally once a day  atorvastatin 40 mg oral tablet: 1 orally once a day  carvedilol 3.125 mg oral tablet: 1 orally every 12 hours HOLD for BP&lt;110 and or HR &lt;60  GlipiZIDE XL 2.5 mg oral tablet, extended release: 1 orally 2 times a day  hydroCHLOROthiazide 25 mg oral tablet: 1 orally once a day as needed for htn  Klor-Con M20 oral tablet, extended release: 1 orally once a day  losartan 25 mg oral tablet: 1 orally once a day HOLD for BP &lt;110  pioglitazone-metformin 15 mg-500 mg oral tablet: 1 orally 2 times a day  Plavix 75 mg oral tablet: 1 orally once a day  Vascepa 1 g oral capsule: 2 orally once a day  Welchol 625 mg oral tablet: 3 orally 2 times a day   aspirin 81 mg oral tablet: 1 orally once a day  atorvastatin 40 mg oral tablet: 1 orally once a day  carvedilol 3.125 mg oral tablet: 1 orally every 12 hours HOLD for BP&lt;110 and or HR &lt;60  GlipiZIDE XL 2.5 mg oral tablet, extended release: 1 orally 2 times a day  hydroCHLOROthiazide 25 mg oral tablet: 1 orally once a day as needed for htn  Klor-Con M20 oral tablet, extended release: 1 orally once a day  losartan 25 mg oral tablet: 1 orally once a day HOLD for BP &lt;110  oxyCODONE 5 mg oral tablet: 1 tab(s) orally every 6 hours as needed for  moderate pain MDD: 4  pioglitazone-metformin 15 mg-500 mg oral tablet: 1 orally 2 times a day  Plavix 75 mg oral tablet: 1 orally once a day  Senna S 50 mg-8.6 mg oral tablet: 2 tab(s) orally once a day (at bedtime) x 7 days  Tylenol 325 mg oral tablet: 2 tab(s) orally every 6 hours as needed for  mild pain  Vascepa 1 g oral capsule: 2 orally once a day  Welchol 625 mg oral tablet: 3 orally 2 times a day

## 2023-05-18 NOTE — BRIEF OPERATIVE NOTE - NSICDXBRIEFPREOP_GEN_ALL_CORE_FT
PRE-OP DIAGNOSIS:  Displaced spiral fracture of shaft of left femur 18-May-2023 13:57:28  Emre Jamil

## 2023-05-18 NOTE — PROGRESS NOTE ADULT - SUBJECTIVE AND OBJECTIVE BOX
ORTHOPEDIC POST-OP PROGRESS NOTE:    Name: LONA STEINER    MR #: [id number]    Procedure: Left femur IMN.      DOS: 5/18/23 - POD #0.       Pt seen and evaluated at bedside in PACU. Patient comfortable without complaints, pain controlled. Denies CP, SOB, N/V, numbness/tingling.               Vital Signs Last 24 Hrs  T(C): 36.6 (05-18-23 @ 14:55), Max: 36.7 (05-18-23 @ 11:21)  T(F): 97.8 (05-18-23 @ 14:55), Max: 98 (05-18-23 @ 11:21)  HR: 88 (05-18-23 @ 15:45) (81 - 98)  BP: 138/64 (05-18-23 @ 15:45) (127/64 - 154/67)  BP(mean): --  RR: 14 (05-18-23 @ 15:45) (12 - 26)  SpO2: 99% (05-18-23 @ 15:45) (94% - 100%)      General Exam:    General: Pt Alert and oriented, NAD.    LLE:     Dressings C/D/I. No bleeding. No erythema.    Pulses: 2+ dorsalis pedis pulse. Cap refill < 2 sec.    Sensation: Grossly intact to light touch without deficit.    Motor: +TA/GSC/EHL/FHL.         A/P: 72y Male POD#0 s/p left femur IMN.    - Pain Control.  - DVT ppx as prescribed.  - PT eval.  - Weight bearing status: WBAT.   - Diet/GI PPx.  - Post-op labs/Abx.

## 2023-05-18 NOTE — CONSULT NOTE ADULT - PROBLEM SELECTOR RECOMMENDATION 9
Pt presented to Ed after a mechanical fall with severe LLE pain. He started he tripped on stairs in front of his home. He ambulates independently at baseline. Pt with fx of L femur  Plan is OR tomorrow in the AM  Pt will need echo prior to surgery  Pt is currently without angina, dyspnea, syncope, or palpitations however pt has hx of HTN, HLD and CAD sp CABG x 2 (17 years ago) and s/p PCI x 6 (3 years ago last PCI)  EKG shows  RCRI 2 points Class III Risk 10.1 % 30-day risk of death, MI, or cardiac arrest  Prabhakar 0.2 % Risk of myocardial infarction or cardiac arrest, intraoperatively or up to 30 days post-op  Mets > 4 Pt ambulated independently at baseline prior to fall     Base on results we will determine pt risk for surgery  Further recommendations base on above findings Pt presented to Ed after a mechanical fall with severe LLE pain. He started he tripped on stairs in front of his home. He ambulates independently at baseline. Pt with fx of L femur  Plan is OR tomorrow in the AM  Pt will need echo prior to surgery  Pt is currently without angina, dyspnea, syncope, or palpitations however pt has hx of HTN, HLD and CAD sp CABG x 2 (17 years ago) and s/p PCI x 6 (3 years ago last PCI)  EKG shows NSR with incomplete RBBB and Q waves (old MI) in anterior and inferior leads which are new compared to EKG from 2013  RCRI 2 points Class III Risk 10.1 % 30-day risk of death, MI, or cardiac arrest  Prabhakar 0.2 % Risk of myocardial infarction or cardiac arrest, intraoperatively or up to 30 days post-op  Mets > 4 Pt ambulated independently at baseline prior to fall     Base on results we will determine pt risk for surgery  Further recommendations base on above findings Pt presented to Ed after a mechanical fall with severe LLE pain. He started he tripped on stairs in front of his home. He ambulates independently at baseline. Pt with fx of L femur  Plan is OR tomorrow in the AM  Pt will need echo prior to surgery  Pt is currently without angina, dyspnea, syncope, or palpitations however pt has hx of HTN, HLD and CAD sp CABG x 2 (17 years ago) and s/p PCI x 6 (3 years ago last PCI)  EKG shows NSR with incomplete RBBB and Q waves (old MI) in anterior and inferior leads which are new compared to EKG from 2013  RCRI 2 points Class III Risk 10.1 % 30-day risk of death, MI, or cardiac arrest  Prabhakar 0.2 % Risk of myocardial infarction or cardiac arrest, intraoperatively or up to 30 days post-op  Mets > 4 Pt ambulated independently at baseline prior to fall     Base on results we will determine pt risk for surgery  Further recommendations base on above findings  Case discussed with Dr Shrestha

## 2023-05-18 NOTE — DISCHARGE NOTE PROVIDER - CARE PROVIDER_API CALL
Emre Jamil)  Orthopaedic Surgery  [address] [place] [address]  Phone: [phone]  Fax: [phone]  Follow Up Time:    Emre Jamil)  Orthopaedic Surgery  [address] [place] [address]  Phone: [phone]  Fax: [phone]  Follow Up Time:     Abhishek Younger)  Cardiovascular Disease; Internal Medicine  [address] [place] [id number]  Phone: [phone]  Fax: [phone]  Follow Up Time:

## 2023-05-19 DIAGNOSIS — I50.22 CHRONIC SYSTOLIC (CONGESTIVE) HEART FAILURE: ICD-10-CM

## 2023-05-19 DIAGNOSIS — I25.10 ATHEROSCLEROTIC HEART DISEASE OF NATIVE CORONARY ARTERY WITHOUT ANGINA PECTORIS: ICD-10-CM

## 2023-05-19 LAB
GLUCOSE BLDC GLUCOMTR-MCNC: 128 MG/DL — HIGH (ref 70–99)
GLUCOSE BLDC GLUCOMTR-MCNC: 137 MG/DL — HIGH (ref 70–99)
GLUCOSE BLDC GLUCOMTR-MCNC: 148 MG/DL — HIGH (ref 70–99)
GLUCOSE BLDC GLUCOMTR-MCNC: 204 MG/DL — HIGH (ref 70–99)

## 2023-05-19 PROCEDURE — 99232 SBSQ HOSP IP/OBS MODERATE 35: CPT

## 2023-05-19 PROCEDURE — 99233 SBSQ HOSP IP/OBS HIGH 50: CPT

## 2023-05-19 RX ADMIN — ATORVASTATIN CALCIUM 40 MILLIGRAM(S): 80 TABLET, FILM COATED ORAL at 21:23

## 2023-05-19 RX ADMIN — CLOPIDOGREL BISULFATE 75 MILLIGRAM(S): 75 TABLET, FILM COATED ORAL at 13:16

## 2023-05-19 RX ADMIN — Medication 20 MILLIEQUIVALENT(S): at 13:16

## 2023-05-19 RX ADMIN — SODIUM CHLORIDE 75 MILLILITER(S): 9 INJECTION, SOLUTION INTRAVENOUS at 15:14

## 2023-05-19 RX ADMIN — Medication 2000 MILLIGRAM(S): at 06:56

## 2023-05-19 RX ADMIN — Medication 81 MILLIGRAM(S): at 13:15

## 2023-05-19 RX ADMIN — Medication 975 MILLIGRAM(S): at 14:15

## 2023-05-19 RX ADMIN — Medication 4: at 12:38

## 2023-05-19 RX ADMIN — CARVEDILOL PHOSPHATE 3.12 MILLIGRAM(S): 80 CAPSULE, EXTENDED RELEASE ORAL at 17:50

## 2023-05-19 RX ADMIN — Medication 975 MILLIGRAM(S): at 21:23

## 2023-05-19 RX ADMIN — CARVEDILOL PHOSPHATE 3.12 MILLIGRAM(S): 80 CAPSULE, EXTENDED RELEASE ORAL at 06:51

## 2023-05-19 RX ADMIN — Medication 975 MILLIGRAM(S): at 07:40

## 2023-05-19 RX ADMIN — SENNA PLUS 2 TABLET(S): 8.6 TABLET ORAL at 21:23

## 2023-05-19 RX ADMIN — OXYCODONE HYDROCHLORIDE 10 MILLIGRAM(S): 5 TABLET ORAL at 08:42

## 2023-05-19 RX ADMIN — OXYCODONE HYDROCHLORIDE 10 MILLIGRAM(S): 5 TABLET ORAL at 09:42

## 2023-05-19 RX ADMIN — Medication 975 MILLIGRAM(S): at 22:23

## 2023-05-19 RX ADMIN — Medication 975 MILLIGRAM(S): at 06:51

## 2023-05-19 RX ADMIN — Medication 975 MILLIGRAM(S): at 13:15

## 2023-05-19 NOTE — OCCUPATIONAL THERAPY INITIAL EVALUATION ADULT - GENERAL OBSERVATIONS, REHAB EVAL
pt received in bed and left in bed as found, +active IV left UE, c/o pain 8/10 with movement left LE

## 2023-05-19 NOTE — PHYSICAL THERAPY INITIAL EVALUATION ADULT - ADDITIONAL COMMENTS
Pt lives in a house with  3 steps to enter with  rails and  0 stairs inside.  Pt owns medical equipment: None   Pt lives with: Spouse   Someone is always available to provide assist.

## 2023-05-19 NOTE — PROGRESS NOTE ADULT - PROBLEM SELECTOR PLAN 2
.  - new low EF 30-35%  - As per Dr Shrestha spoke with pts cardiologist, previous ECHO 7/22 EF 45-50%  - euvolemic on exam   - GDMT:       Losartan 25mg PO daily       COreg 3.25mg PO BID  - would start Lasix 25mg PO daily   - dc amlodipine/HCTZ to allow room to increase coreg and losartan   - needs follow up with Dr. Lyman next week.

## 2023-05-19 NOTE — PROGRESS NOTE ADULT - PROBLEM SELECTOR PLAN 1
.  - CAD sp CABG x 2 (17 years ago) and s/p PCI x 6 (3 years ago last PCI)  - cont asa statin  - will need ischemic work up as out patient

## 2023-05-19 NOTE — PROGRESS NOTE ADULT - SUBJECTIVE AND OBJECTIVE BOX
ORTHO-POST-OP PROGRESS NOTE:    [id number]    LONA STEINER    PROCEDURE: Left femur IMN    DOS: 5/18      SUBJECTIVE: 72y Patient seen and examined. Patient reports of moderate discomfort that is controlled by pain medications. Participated in PT this am. Patient denies of acute sensory or motor changes. No other orthopedic complaints.                             13.2   7.31  )-----------( 142      ( 17 May 2023 14:20 )             39.7           I&O's Detail    18 May 2023 07:01  -  19 May 2023 07:00  --------------------------------------------------------  IN:  Total IN: 0 mL    OUT:    Voided (mL): 350 mL  Total OUT: 350 mL    Total NET: -350 mL        acetaminophen     Tablet .. 975 milliGRAM(s) Oral every 8 hours  aluminum hydroxide/magnesium hydroxide/simethicone Suspension 30 milliLiter(s) Oral four times a day PRN  aspirin enteric coated 81 milliGRAM(s) Oral daily  atorvastatin 40 milliGRAM(s) Oral at bedtime  carvedilol 3.125 milliGRAM(s) Oral every 12 hours  clopidogrel Tablet 75 milliGRAM(s) Oral daily  dextrose 5%. 1000 milliLiter(s) IV Continuous <Continuous>  dextrose 5%. 1000 milliLiter(s) IV Continuous <Continuous>  dextrose 50% Injectable 12.5 Gram(s) IV Push once  dextrose 50% Injectable 25 Gram(s) IV Push once  dextrose 50% Injectable 25 Gram(s) IV Push once  dextrose Oral Gel 15 Gram(s) Oral once PRN  glucagon  Injectable 1 milliGRAM(s) IntraMuscular once  HYDROmorphone   Tablet 4 milliGRAM(s) Oral every 3 hours PRN  insulin lispro (ADMELOG) corrective regimen sliding scale   SubCutaneous three times a day before meals  insulin lispro (ADMELOG) corrective regimen sliding scale   SubCutaneous at bedtime  lactated ringers. 1000 milliLiter(s) IV Continuous <Continuous>  magnesium hydroxide Suspension 30 milliLiter(s) Oral daily PRN  ondansetron Injectable 4 milliGRAM(s) IV Push every 6 hours PRN  oxyCODONE    IR 10 milliGRAM(s) Oral every 3 hours PRN  oxyCODONE    IR 5 milliGRAM(s) Oral every 3 hours PRN  potassium chloride    Tablet ER 20 milliEquivalent(s) Oral daily  senna 2 Tablet(s) Oral at bedtime        T(C): 36.9 (05-19-23 @ 04:59), Max: 36.9 (05-18-23 @ 23:51)  HR: 96 (05-19-23 @ 04:59) (81 - 98)  BP: 135/68 (05-19-23 @ 04:59) (114/64 - 160/65)  RR: 18 (05-19-23 @ 04:59) (12 - 26)  SpO2: 95% (05-19-23 @ 04:59) (93% - 100%)  Wt(kg): --      PHYSICAL EXAM:     Constitutional: Alert, responsive, in no acute distress.     Injured Extremity:          Dressing: LLE: Left hip/lateral thigh/lateral knee Mepilex Clean/dry/intact. No active bleeding or discharge noted.            Sensation: normal to light touch.                Motor exam: Calf soft nontender b/l, LLE: + EHL/FHL,                                                    Vascular:  + warm well perfused; capillary refill <3 seconds, +DP pulses b/l                                                      A/P :  72 Male S/P Left femur IMN  POD# 1    -  Pain control  -  DVT ppx: ASA/plavix   -  PT and out of bed today  -  Weight bearing status: WBAT [X]  -  Dispo: Home   ORTHO-POST-OP PROGRESS NOTE:    [id number]    LONA STEINER    PROCEDURE: Left femur IMN    DOS: 5/18      SUBJECTIVE: 72y Patient seen and examined. Patient reports of moderate discomfort that is controlled by pain medications. Participated in PT this am. Patient denies of acute sensory or motor changes. No other orthopedic complaints.                             13.2   7.31  )-----------( 142      ( 17 May 2023 14:20 )             39.7           I&O's Detail    18 May 2023 07:01  -  19 May 2023 07:00  --------------------------------------------------------  IN:  Total IN: 0 mL    OUT:    Voided (mL): 350 mL  Total OUT: 350 mL    Total NET: -350 mL        acetaminophen     Tablet .. 975 milliGRAM(s) Oral every 8 hours  aluminum hydroxide/magnesium hydroxide/simethicone Suspension 30 milliLiter(s) Oral four times a day PRN  aspirin enteric coated 81 milliGRAM(s) Oral daily  atorvastatin 40 milliGRAM(s) Oral at bedtime  carvedilol 3.125 milliGRAM(s) Oral every 12 hours  clopidogrel Tablet 75 milliGRAM(s) Oral daily  dextrose 5%. 1000 milliLiter(s) IV Continuous <Continuous>  dextrose 5%. 1000 milliLiter(s) IV Continuous <Continuous>  dextrose 50% Injectable 12.5 Gram(s) IV Push once  dextrose 50% Injectable 25 Gram(s) IV Push once  dextrose 50% Injectable 25 Gram(s) IV Push once  dextrose Oral Gel 15 Gram(s) Oral once PRN  glucagon  Injectable 1 milliGRAM(s) IntraMuscular once  HYDROmorphone   Tablet 4 milliGRAM(s) Oral every 3 hours PRN  insulin lispro (ADMELOG) corrective regimen sliding scale   SubCutaneous three times a day before meals  insulin lispro (ADMELOG) corrective regimen sliding scale   SubCutaneous at bedtime  lactated ringers. 1000 milliLiter(s) IV Continuous <Continuous>  magnesium hydroxide Suspension 30 milliLiter(s) Oral daily PRN  ondansetron Injectable 4 milliGRAM(s) IV Push every 6 hours PRN  oxyCODONE    IR 10 milliGRAM(s) Oral every 3 hours PRN  oxyCODONE    IR 5 milliGRAM(s) Oral every 3 hours PRN  potassium chloride    Tablet ER 20 milliEquivalent(s) Oral daily  senna 2 Tablet(s) Oral at bedtime        T(C): 36.9 (05-19-23 @ 04:59), Max: 36.9 (05-18-23 @ 23:51)  HR: 96 (05-19-23 @ 04:59) (81 - 98)  BP: 135/68 (05-19-23 @ 04:59) (114/64 - 160/65)  RR: 18 (05-19-23 @ 04:59) (12 - 26)  SpO2: 95% (05-19-23 @ 04:59) (93% - 100%)  Wt(kg): --      PHYSICAL EXAM:     Constitutional: Alert, responsive, in no acute distress.     Injured Extremity:          Dressing: LLE: Left hip/lateral thigh/lateral knee Mepilex Clean/dry/intact. No active bleeding or discharge noted.            Skin: No erythema            Sensation: normal to light touch.                Motor exam: Calf soft nontender b/l, LLE: + EHL/FHL,                                                    Vascular:  + warm well perfused; capillary refill <3 seconds, +DP pulses b/l                                                      A/P :  72 Male S/P Left femur IMN  POD# 1  - stable  -  Pain control  -  DVT ppx: ASA/plavix   -  PT and out of bed today  -  Weight bearing status: WBAT [X]  -  Dispo: Home

## 2023-05-19 NOTE — PHYSICAL THERAPY INITIAL EVALUATION ADULT - ACTIVE RANGE OF MOTION EXAMINATION, REHAB EVAL
Left LE kn ee flexion ot approx 75 knee flexion: Pain/bilateral upper extremity Active ROM was WFL (within functional limits)/bilateral  lower extremity Active ROM was WFL (within functional limits)/deficits as listed below

## 2023-05-19 NOTE — PHYSICAL THERAPY INITIAL EVALUATION ADULT - GAIT TRAINING, PT EVAL
Time Frame: 2-3    days   Goal: Modified Independent with RW x 250 feet / Stairs: pt will navigate 3 stairs with 1 rail independently

## 2023-05-19 NOTE — PROGRESS NOTE ADULT - ASSESSMENT
71 yo male with pmhx HTN, HLD, T2DM, CAD s/p PCI/CABG presenting to the ED after a mechanical fall with severe LLE pain.    Acute L Femur Fracture s/p Mechanical Fall  s/p IMN,   PT/OT/pain mgmt  DVT prophylaxis- as per ortho  Abx as per SCIP- given   Incentive spirometry  Prophylaxis of opioid  induced constipation.  Wound care/ WB status  as per ortho .      CAD s/p PCI, HTN, HLD, ECHO noted with EF of 30%-35% ,   spoke to cradilogist Dr Shrestha - new decreased EF as per patient own cardiologist Dr Younger .   Plan patient to see own cardiologist after discharge .  -Continue Atorvastatin 10 mg qhs  -Continue Losartan 25 mg daily, Norvasc 10 mg daily, Coreg 3.125 mg BID  -Patient takes HCTZ 25 mg as PRN for LE swelling, can hold for now  -Resume DAPT Aspirin and Plavix as soon after procedure as possible per ortho     T2DM  -Hold PO meds while hospitalized, restart on discharge   -ISS/Accuchecks/A1c  A1C with Estimated Average Glucose (05.18.23 @ 05:03)    A1C with Estimated Average Glucose Result: 5.7 %   Estimated Average Glucose: 117 mg/dL  -Carb controlled diet once patient can eat post-op    DVT prophylaxis  - as per ortho protocol  Opioid induced constipation  prophylaxis - bowel regimen      Medically stable to d/c once cleared by physical therapy / ortho .   Will sign of , please recall if any medical issue . 
72y old  Male who presents with a chief complaint of left femur fracture. TTE with new low EF

## 2023-05-19 NOTE — PROGRESS NOTE ADULT - NS ATTEND AMEND GEN_ALL_CORE FT
Agree with above and patient will follow up with Dr. Younger in the outpatient setting   no further cardiac intervention     Amber Ron D.O. [facility]  Cardiology/Vascular Cardiology -[facility] Cardiology   Telephone # [phone]

## 2023-05-19 NOTE — PROGRESS NOTE ADULT - SUBJECTIVE AND OBJECTIVE BOX
[facility]                                                         CARDIOLOGY AT [facility]                                                                  [address], [place] [address]                                                         Telephone: [phone]. Fax:[phone]                                                                             PROGRESS NOTE    Reason for follow up: post op femur fxl new low EF  Update: GDMT      Review of symptoms:   Cardiac:  No chest pain. No dyspnea. No palpitations.  Respiratory: no cough. No dyspnea  Gastrointestinal: No diarrhea. No abdominal pain. No bleeding.   Neuro: No focal neuro complaints.        Vitals:  T(C): 37.1 (05-19-23 @ 08:41), Max: 37.1 (05-19-23 @ 08:41)  HR: 79 (05-19-23 @ 08:41) (79 - 98)  BP: 110/68 (05-19-23 @ 08:41) (110/68 - 160/65)  RR: 18 (05-19-23 @ 08:41) (12 - 26)  SpO2: 96% (05-19-23 @ 08:41) (90% - 100%)        I&O's Summary    18 May 2023 07:01  -  19 May 2023 07:00  --------------------------------------------------------  IN: 0 mL / OUT: 350 mL / NET: -350 mL      Weight (kg): 117.9 (05-18 @ 11:21)        PHYSICAL EXAM:  Appearance: Comfortable. No acute distress  HEENT:  Atraumatic. Normocephalic.  Normal oral mucosa  Neurologic: A & O x 3, no gross focal deficits.  Cardiovascular: RRR S1 S2, No murmur, no rubs/gallops. No JVD  Respiratory: Lungs clear to auscultation, unlabored   Gastrointestinal:  Soft, Non-tender, + BS  Lower Extremities: No edema  Psychiatry: Patient is calm. No agitation.   Skin: warm and dry.        CURRENT CARDIAC MEDICATIONS:  carvedilol 3.125 milliGRAM(s) Oral every 12 hours        CURRENT OTHER MEDICATIONS:  acetaminophen     Tablet .. 975 milliGRAM(s) Oral every 8 hours  HYDROmorphone   Tablet 4 milliGRAM(s) Oral every 3 hours PRN Severe Pain (7 - 10)  ondansetron Injectable 4 milliGRAM(s) IV Push every 6 hours PRN Nausea and/or Vomiting  oxyCODONE    IR 10 milliGRAM(s) Oral every 3 hours PRN Moderate Pain (4 - 6)  oxyCODONE    IR 5 milliGRAM(s) Oral every 3 hours PRN Mild Pain (1 - 3)  aluminum hydroxide/magnesium hydroxide/simethicone Suspension 30 milliLiter(s) Oral four times a day PRN Indigestion  magnesium hydroxide Suspension 30 milliLiter(s) Oral daily PRN Constipation  senna 2 Tablet(s) Oral at bedtime  atorvastatin 40 milliGRAM(s) Oral at bedtime  dextrose Oral Gel 15 Gram(s) Oral once, Stop order after: 1 Doses PRN Blood Glucose LESS THAN 70 milliGRAM(s)/deciliter  glucagon  Injectable 1 milliGRAM(s) IntraMuscular once, Stop order after: 1 Doses  insulin lispro (ADMELOG) corrective regimen sliding scale   SubCutaneous at bedtime  insulin lispro (ADMELOG) corrective regimen sliding scale   SubCutaneous three times a day before meals  aspirin enteric coated 81 milliGRAM(s) Oral daily  clopidogrel Tablet 75 milliGRAM(s) Oral daily  lactated ringers. 1000 milliLiter(s) (75 mL/Hr) IV Continuous <Continuous>  potassium chloride    Tablet ER 20 milliEquivalent(s) Oral daily        LABS:	 	                            13.2   7.31  )-----------( 142      ( 17 May 2023 14:20 )             39.7     05-18    x   |  x   |  x   ----------------------------<  182<H>  x    |  x   |  x     Ca    9.3      17 May 2023 14:20    TPro  6.8  /  Alb  4.2  /  TBili  0.4  /  DBili  x   /  AST  30  /  ALT  24  /  AlkPhos  72  05-17    PT/INR/PTT ( 17 May 2023 14:20 )                       :                       :      12.9         :       33.1                  .        .                   .              .           .       1.11        .                                           TELEMETRY: SR, PVCS, AT <3 secs      DIAGNOSTIC TESTING:  [ ] Echocardiogram:   < from: TTE Echo Complete w/ Contrast w/ Doppler (05.18.23 @ 08:08) >  Summary:   1. Technically difficult study.   2. Endocardial visualization was enhanced with intravenous echo contrast.   3. Severely decreased global left ventricular systolic function.   4. Left ventricular ejection fraction, by visual estimation, is 30 to 35%.   5. Basal and mid anterolateral wall, basal and mid inferior wall, basal   inferoseptal segment, and apical lateral segment are abnormal as   described above.   6. Abnormal septal motion consistent with post-operative status.   7. Moderately enlarged left atrium.   8. The right atrium is normal in size.   9. Sclerotic aortic valve with normal opening.  10. Mild thickening and calcification of the anterior and posterior mitral valve leaflets.  11. Trace mitral valve regurgitation.  12. There is no evidence of pericardial effusion.    MD Ricardo Electronically signed on 5/18/2023 at 8:46:35 AM    < end of copied text >

## 2023-05-19 NOTE — PROGRESS NOTE ADULT - SUBJECTIVE AND OBJECTIVE BOX
Patient seen and examined . S/p L hip IMN  , POD # 1. Pain well controlled , denies n/v, voiding ,   tolerating diet .     CC : L hip pain after fall , postop pain well controlled .         MEDICATIONS  (STANDING):  acetaminophen     Tablet .. 975 milliGRAM(s) Oral every 8 hours  aspirin enteric coated 81 milliGRAM(s) Oral daily  atorvastatin 40 milliGRAM(s) Oral at bedtime  carvedilol 3.125 milliGRAM(s) Oral every 12 hours  clopidogrel Tablet 75 milliGRAM(s) Oral daily  dextrose 5%. 1000 milliLiter(s) (100 mL/Hr) IV Continuous <Continuous>  dextrose 5%. 1000 milliLiter(s) (50 mL/Hr) IV Continuous <Continuous>  dextrose 50% Injectable 25 Gram(s) IV Push once  dextrose 50% Injectable 12.5 Gram(s) IV Push once  dextrose 50% Injectable 25 Gram(s) IV Push once  glucagon  Injectable 1 milliGRAM(s) IntraMuscular once  insulin lispro (ADMELOG) corrective regimen sliding scale   SubCutaneous at bedtime  insulin lispro (ADMELOG) corrective regimen sliding scale   SubCutaneous three times a day before meals  lactated ringers. 1000 milliLiter(s) (75 mL/Hr) IV Continuous <Continuous>  potassium chloride    Tablet ER 20 milliEquivalent(s) Oral daily  senna 2 Tablet(s) Oral at bedtime    MEDICATIONS  (PRN):  aluminum hydroxide/magnesium hydroxide/simethicone Suspension 30 milliLiter(s) Oral four times a day PRN Indigestion  dextrose Oral Gel 15 Gram(s) Oral once PRN Blood Glucose LESS THAN 70 milliGRAM(s)/deciliter  HYDROmorphone   Tablet 4 milliGRAM(s) Oral every 3 hours PRN Severe Pain (7 - 10)  magnesium hydroxide Suspension 30 milliLiter(s) Oral daily PRN Constipation  ondansetron Injectable 4 milliGRAM(s) IV Push every 6 hours PRN Nausea and/or Vomiting  oxyCODONE    IR 10 milliGRAM(s) Oral every 3 hours PRN Moderate Pain (4 - 6)  oxyCODONE    IR 5 milliGRAM(s) Oral every 3 hours PRN Mild Pain (1 - 3)      LABS:                          13.2   7.31  )-----------( 142      ( 17 May 2023 14:20 )             39.7     05-18    x   |  x   |  x   ----------------------------<  182<H>  x    |  x   |  x     Ca    9.3      17 May 2023 14:20    TPro  6.8  /  Alb  4.2  /  TBili  0.4  /  DBili  x   /  AST  30  /  ALT  24  /  AlkPhos  72  05-17    PT/INR - ( 17 May 2023 14:20 )   PT: 12.9 sec;   INR: 1.11 ratio         PTT - ( 17 May 2023 14:20 )  PTT:33.1 sec      RADIOLOGY & ADDITIONAL TESTS:    < from: Xray Femur 2 Views, Left (05.17.23 @ 17:39) >    ACC: [id number] EXAM:  XR FEMUR 2 VIEWS LT   ORDERED BY: ASAHNTI WEBB     ACC: [id number] EXAM:  XR HIP WITH PELV 2-3V LT   ORDERED BY: ASHANTI WEBB     PROCEDURE DATE:  05/17/2023          INTERPRETATION:  Radiographs of the LEFT hip and LEFT femur    CLINICAL INFORMATION:  Injury with   Pain.    TECHNIQUE:   AP and oblique views of the hip. AP pelvis view. AP lateral   LEFT femur radiographs    FINDINGS:   No prior exams are available for comparison.    Spiral displaced fracture of the proximal LEFT femoral diaphysis of.   Femoral head and neck are intact.  Remaining pelvis and distal femur intact.  < from: 12 Lead ECG (05.17.23 @ 18:12) >    Ventricular Rate 85 BPM    Atrial Rate 85 BPM    P-R Interval 164 ms    QRS Duration 110 ms    Q-T Interval 372 ms    QTC Calculation(Bazett) 442 ms    P Axis 48 degrees    R Axis -21 degrees    T Axis 141 degrees    Diagnosis Line Normal sinus rhythm  Incomplete right bundle branch block  Minimal voltage criteria for LVH, may be normal variant  Non-specific intra-ventricular conduction delay  Leftward axis  T wave abnormality, consider lateral ischemia  Abnormal ECG    Confirmed by LUIS LEE ([id number]) on 5/18/2023 9:05:0    < end of copied text >    IMPRESSION:    Spiral displaced fracture of proximal RIGHT femoral   diaphysis.    --- End of Report ---      < end of copied text >    < from: TTE Echo Complete w/ Contrast w/ Doppler (05.18.23 @ 08:08) >    ACC: [id number] EXAM:  ECHO TTE WITH CON COMP W DOPP                          PROCEDURE DATE:  05/18/2023          INTERPRETATION:  TRANSTHORACIC ECHOCARDIOGRAM REPORT    < end of copied text >  < from: TTE Echo Complete w/ Contrast w/ Doppler (05.18.23 @ 08:08) >  Summary:   1. Technically difficult study.   2. Endocardial visualization was enhanced with intravenous echo contrast.   3. Severely decreased global left ventricular systolic function.   4. Left ventricular ejection fraction, by visual estimation, is 30 to   35%.   5. Basal and mid anterolateral wall, basal and mid inferior wall, basal   inferoseptal segment, and apical lateral segment are abnormal as   described above.   6. Abnormal septal motion consistent with post-operative status.   7. Moderately enlarged left atrium.   8. The right atrium is normal in size.   9. Sclerotic aortic valve with normal opening.  10. Mild thickening and calcification of the anterior and posterior   mitral valve leaflets.  11. Trace mitral valve regurgitation.  12. There is no evidence of pericardial effusion.    MD Ricardo Electronically signed on 5/18/2023 at 8:46:35 AM    < end of copied text >          REVIEW OF SYSTEMS:    L hip pain posto well controlled , all other systems are reviewed and are negative .     Vital Signs Last 24 Hrs  T(C): 37.1 (19 May 2023 08:41), Max: 37.1 (19 May 2023 08:41)  T(F): 98.7 (19 May 2023 08:41), Max: 98.7 (19 May 2023 08:41)  HR: 79 (19 May 2023 08:41) (79 - 98)  BP: 110/68 (19 May 2023 08:41) (110/68 - 160/65)  BP(mean): --  RR: 18 (19 May 2023 08:41) (12 - 26)  SpO2: 96% (19 May 2023 08:41) (90% - 100%)    Parameters below as of 19 May 2023 08:41  Patient On (Oxygen Delivery Method): room air      PHYSICAL EXAM:    GENERAL: NAD, well-groomed, well-developed  HEAD:  Atraumatic, Normocephalic  EYES: EOMI, PERRLA, conjunctiva and sclera clear  NECK: Supple, No JVD, Normal thyroid  NERVOUS SYSTEM:  Alert & Oriented X3, no focal deficit  CHEST/LUNG: CTA b/l ,  no  rales, rhonchi, wheezing, or rubs  HEART: Regular rate and rhythm; No murmurs, rubs, or gallops  ABDOMEN: Soft, Nontender, Nondistended; Bowel sounds present  EXTREMITIES:  2+ Peripheral Pulses, No clubbing, cyanosis, or edema,   L hip dressing + , clean and dry   LYMPH: No lymphadenopathy noted  SKIN: No rashes or lesions

## 2023-05-20 ENCOUNTER — TRANSCRIPTION ENCOUNTER (OUTPATIENT)
Age: 73
End: 2023-05-20

## 2023-05-20 LAB
ANION GAP SERPL CALC-SCNC: 13 MMOL/L — SIGNIFICANT CHANGE UP (ref 5–17)
BUN SERPL-MCNC: 15.4 MG/DL — SIGNIFICANT CHANGE UP (ref 8–20)
CALCIUM SERPL-MCNC: 8.7 MG/DL — SIGNIFICANT CHANGE UP (ref 8.4–10.5)
CHLORIDE SERPL-SCNC: 102 MMOL/L — SIGNIFICANT CHANGE UP (ref 96–108)
CO2 SERPL-SCNC: 25 MMOL/L — SIGNIFICANT CHANGE UP (ref 22–29)
CREAT SERPL-MCNC: 0.87 MG/DL — SIGNIFICANT CHANGE UP (ref 0.5–1.3)
EGFR: 92 ML/MIN/1.73M2 — SIGNIFICANT CHANGE UP
GLUCOSE BLDC GLUCOMTR-MCNC: 127 MG/DL — HIGH (ref 70–99)
GLUCOSE BLDC GLUCOMTR-MCNC: 141 MG/DL — HIGH (ref 70–99)
GLUCOSE BLDC GLUCOMTR-MCNC: 158 MG/DL — HIGH (ref 70–99)
GLUCOSE BLDC GLUCOMTR-MCNC: 161 MG/DL — HIGH (ref 70–99)
GLUCOSE SERPL-MCNC: 204 MG/DL — HIGH (ref 70–99)
HCT VFR BLD CALC: 33.6 % — LOW (ref 39–50)
HGB BLD-MCNC: 10.7 G/DL — LOW (ref 13–17)
MCHC RBC-ENTMCNC: 31.8 GM/DL — LOW (ref 32–36)
MCHC RBC-ENTMCNC: 31.8 PG — SIGNIFICANT CHANGE UP (ref 27–34)
MCV RBC AUTO: 100 FL — SIGNIFICANT CHANGE UP (ref 80–100)
PLATELET # BLD AUTO: 129 K/UL — LOW (ref 150–400)
POTASSIUM SERPL-MCNC: 4.6 MMOL/L — SIGNIFICANT CHANGE UP (ref 3.5–5.3)
POTASSIUM SERPL-SCNC: 4.6 MMOL/L — SIGNIFICANT CHANGE UP (ref 3.5–5.3)
RBC # BLD: 3.36 M/UL — LOW (ref 4.2–5.8)
RBC # FLD: 14 % — SIGNIFICANT CHANGE UP (ref 10.3–14.5)
SODIUM SERPL-SCNC: 140 MMOL/L — SIGNIFICANT CHANGE UP (ref 135–145)
WBC # BLD: 9.95 K/UL — SIGNIFICANT CHANGE UP (ref 3.8–10.5)
WBC # FLD AUTO: 9.95 K/UL — SIGNIFICANT CHANGE UP (ref 3.8–10.5)

## 2023-05-20 RX ORDER — SENNOSIDES/DOCUSATE SODIUM 8.6MG-50MG
2 TABLET ORAL
Qty: 14 | Refills: 0
Start: 2023-05-20 | End: 2023-05-26

## 2023-05-20 RX ORDER — OXYCODONE HYDROCHLORIDE 5 MG/1
1 TABLET ORAL
Qty: 28 | Refills: 0
Start: 2023-05-20 | End: 2023-05-26

## 2023-05-20 RX ORDER — ACETAMINOPHEN 500 MG
2 TABLET ORAL
Qty: 56 | Refills: 0
Start: 2023-05-20 | End: 2023-05-26

## 2023-05-20 RX ORDER — AMLODIPINE BESYLATE 2.5 MG/1
1 TABLET ORAL
Refills: 0 | DISCHARGE

## 2023-05-20 RX ORDER — FUROSEMIDE 40 MG
20 TABLET ORAL DAILY
Refills: 0 | Status: DISCONTINUED | OUTPATIENT
Start: 2023-05-20 | End: 2023-05-21

## 2023-05-20 RX ADMIN — Medication 975 MILLIGRAM(S): at 05:31

## 2023-05-20 RX ADMIN — Medication 975 MILLIGRAM(S): at 13:15

## 2023-05-20 RX ADMIN — Medication 975 MILLIGRAM(S): at 12:15

## 2023-05-20 RX ADMIN — LOSARTAN POTASSIUM 25 MILLIGRAM(S): 100 TABLET, FILM COATED ORAL at 05:30

## 2023-05-20 RX ADMIN — Medication 20 MILLIEQUIVALENT(S): at 12:12

## 2023-05-20 RX ADMIN — ATORVASTATIN CALCIUM 40 MILLIGRAM(S): 80 TABLET, FILM COATED ORAL at 21:54

## 2023-05-20 RX ADMIN — Medication 975 MILLIGRAM(S): at 21:54

## 2023-05-20 RX ADMIN — AMLODIPINE BESYLATE 10 MILLIGRAM(S): 2.5 TABLET ORAL at 05:30

## 2023-05-20 RX ADMIN — CLOPIDOGREL BISULFATE 75 MILLIGRAM(S): 75 TABLET, FILM COATED ORAL at 12:12

## 2023-05-20 RX ADMIN — CARVEDILOL PHOSPHATE 3.12 MILLIGRAM(S): 80 CAPSULE, EXTENDED RELEASE ORAL at 16:27

## 2023-05-20 RX ADMIN — Medication 81 MILLIGRAM(S): at 12:12

## 2023-05-20 RX ADMIN — Medication 2: at 16:28

## 2023-05-20 RX ADMIN — CARVEDILOL PHOSPHATE 3.12 MILLIGRAM(S): 80 CAPSULE, EXTENDED RELEASE ORAL at 05:31

## 2023-05-20 RX ADMIN — HYDROMORPHONE HYDROCHLORIDE 4 MILLIGRAM(S): 2 INJECTION INTRAMUSCULAR; INTRAVENOUS; SUBCUTANEOUS at 14:10

## 2023-05-20 RX ADMIN — Medication 975 MILLIGRAM(S): at 06:31

## 2023-05-20 RX ADMIN — SENNA PLUS 2 TABLET(S): 8.6 TABLET ORAL at 21:54

## 2023-05-20 RX ADMIN — HYDROMORPHONE HYDROCHLORIDE 4 MILLIGRAM(S): 2 INJECTION INTRAMUSCULAR; INTRAVENOUS; SUBCUTANEOUS at 15:10

## 2023-05-20 RX ADMIN — OXYCODONE HYDROCHLORIDE 10 MILLIGRAM(S): 5 TABLET ORAL at 06:49

## 2023-05-20 NOTE — PROGRESS NOTE ADULT - SUBJECTIVE AND OBJECTIVE BOX
Ortho Post Op Check    Name: LONA STEINER    MR #: [id number]    Procedure: Left femur IM nail  Surgeon: Dr Jamil    Pt comfortable without complaints, pain controlled yet states he feels increase in pain when working w PT   Denies CP, SOB, N/V, numbness/tingling         PAST MEDICAL & SURGICAL HISTORY:  CAD (coronary artery disease)    Type 2 diabetes mellitus    Hypertension    Hyperlipidemia    S/P cardiac catheterization    S/P coronary artery stent placement    Fracture of right lower leg    S/P hernia repair              General Exam:  Vital Signs Last 24 Hrs  T(C): 36.7 (05-20-23 @ 09:56), Max: 36.7 (05-20-23 @ 09:56)  T(F): 98.1 (05-20-23 @ 09:56), Max: 98.1 (05-20-23 @ 09:56)  HR: 84 (05-20-23 @ 09:56) (84 - 84)  BP: 133/70 (05-20-23 @ 09:56) (133/70 - 133/70)  BP(mean): --  RR: 18 (05-20-23 @ 09:56) (18 - 18)  SpO2: 94% (05-20-23 @ 09:56) (94% - 94%)    General: Pt Alert and oriented, NAD, controlled pain.  Both proximal and distal dressings remain C/D/I. No bleeding.  Pulses: 2+ dorsalis pedis pulse. Cap refill < 2 sec.  Sensation: Grossly intact to light touch without deficit.  Motor: + EHL/FHL/TA/GS  Calf soft, supple and nontender          MEDICATIONS  (STANDING):  acetaminophen     Tablet .. 975 milliGRAM(s) Oral every 8 hours  aspirin enteric coated 81 milliGRAM(s) Oral daily  atorvastatin 40 milliGRAM(s) Oral at bedtime  carvedilol 3.125 milliGRAM(s) Oral every 12 hours  clopidogrel Tablet 75 milliGRAM(s) Oral daily  dextrose 5%. 1000 milliLiter(s) (50 mL/Hr) IV Continuous <Continuous>  dextrose 5%. 1000 milliLiter(s) (100 mL/Hr) IV Continuous <Continuous>  dextrose 50% Injectable 12.5 Gram(s) IV Push once  dextrose 50% Injectable 25 Gram(s) IV Push once  dextrose 50% Injectable 25 Gram(s) IV Push once  furosemide    Tablet 20 milliGRAM(s) Oral daily  glucagon  Injectable 1 milliGRAM(s) IntraMuscular once  insulin lispro (ADMELOG) corrective regimen sliding scale   SubCutaneous at bedtime  insulin lispro (ADMELOG) corrective regimen sliding scale   SubCutaneous three times a day before meals  losartan 25 milliGRAM(s) Oral daily  potassium chloride    Tablet ER 20 milliEquivalent(s) Oral daily  senna 2 Tablet(s) Oral at bedtime    MEDICATIONS  (PRN):  aluminum hydroxide/magnesium hydroxide/simethicone Suspension 30 milliLiter(s) Oral four times a day PRN Indigestion  dextrose Oral Gel 15 Gram(s) Oral once PRN Blood Glucose LESS THAN 70 milliGRAM(s)/deciliter  HYDROmorphone   Tablet 4 milliGRAM(s) Oral every 3 hours PRN Severe Pain (7 - 10)  magnesium hydroxide Suspension 30 milliLiter(s) Oral daily PRN Constipation  ondansetron Injectable 4 milliGRAM(s) IV Push every 6 hours PRN Nausea and/or Vomiting  oxyCODONE    IR 10 milliGRAM(s) Oral every 3 hours PRN Moderate Pain (4 - 6)  oxyCODONE    IR 5 milliGRAM(s) Oral every 3 hours PRN Mild Pain (1 - 3)              A/P: 72yMale POD#2 s/p Left femur IM nail  - Stable  - Pain Control  - DVT ppx: Plavix / ASA  - Weight bearing status: WBAT w assistance  - Once cleared by PT may DC to home today vs tomorrow  - Patient aware he must follow up with his cardiologist within 1-2 weeks as out patient regarding decrease in EF

## 2023-05-20 NOTE — DISCHARGE NOTE NURSING/CASE MANAGEMENT/SOCIAL WORK - PATIENT PORTAL LINK FT
You can access the FollowMyHealth Patient Portal offered by [facility] by registering at the following website: [email or web address]. By joining [employer]’s FollowMyHealth portal, you will also be able to view your health information using other applications (apps) compatible with our system.

## 2023-05-21 ENCOUNTER — FORM ENCOUNTER (OUTPATIENT)
Age: 73
End: 2023-05-21

## 2023-05-21 VITALS
RESPIRATION RATE: 18 BRPM | HEART RATE: 85 BPM | DIASTOLIC BLOOD PRESSURE: 72 MMHG | OXYGEN SATURATION: 95 % | SYSTOLIC BLOOD PRESSURE: 133 MMHG | TEMPERATURE: 98 F

## 2023-05-21 LAB
GLUCOSE BLDC GLUCOMTR-MCNC: 141 MG/DL — HIGH (ref 70–99)
GLUCOSE BLDC GLUCOMTR-MCNC: 170 MG/DL — HIGH (ref 70–99)

## 2023-05-21 PROCEDURE — 36415 COLL VENOUS BLD VENIPUNCTURE: CPT

## 2023-05-21 PROCEDURE — 93005 ELECTROCARDIOGRAM TRACING: CPT

## 2023-05-21 PROCEDURE — 73552 X-RAY EXAM OF FEMUR 2/>: CPT

## 2023-05-21 PROCEDURE — 85730 THROMBOPLASTIN TIME PARTIAL: CPT

## 2023-05-21 PROCEDURE — 82947 ASSAY GLUCOSE BLOOD QUANT: CPT

## 2023-05-21 PROCEDURE — 85610 PROTHROMBIN TIME: CPT

## 2023-05-21 PROCEDURE — 76000 FLUOROSCOPY <1 HR PHYS/QHP: CPT

## 2023-05-21 PROCEDURE — 82962 GLUCOSE BLOOD TEST: CPT

## 2023-05-21 PROCEDURE — 96375 TX/PRO/DX INJ NEW DRUG ADDON: CPT

## 2023-05-21 PROCEDURE — C9399: CPT

## 2023-05-21 PROCEDURE — 86803 HEPATITIS C AB TEST: CPT

## 2023-05-21 PROCEDURE — 86901 BLOOD TYPING SEROLOGIC RH(D): CPT

## 2023-05-21 PROCEDURE — C1889: CPT

## 2023-05-21 PROCEDURE — 71045 X-RAY EXAM CHEST 1 VIEW: CPT

## 2023-05-21 PROCEDURE — 86703 HIV-1/HIV-2 1 RESULT ANTBDY: CPT

## 2023-05-21 PROCEDURE — 85025 COMPLETE CBC W/AUTO DIFF WBC: CPT

## 2023-05-21 PROCEDURE — 86850 RBC ANTIBODY SCREEN: CPT

## 2023-05-21 PROCEDURE — 80053 COMPREHEN METABOLIC PANEL: CPT

## 2023-05-21 PROCEDURE — 96374 THER/PROPH/DIAG INJ IV PUSH: CPT

## 2023-05-21 PROCEDURE — 87641 MR-STAPH DNA AMP PROBE: CPT

## 2023-05-21 PROCEDURE — 85027 COMPLETE CBC AUTOMATED: CPT

## 2023-05-21 PROCEDURE — 87640 STAPH A DNA AMP PROBE: CPT

## 2023-05-21 PROCEDURE — 73502 X-RAY EXAM HIP UNI 2-3 VIEWS: CPT

## 2023-05-21 PROCEDURE — 80048 BASIC METABOLIC PNL TOTAL CA: CPT

## 2023-05-21 PROCEDURE — C8929: CPT

## 2023-05-21 PROCEDURE — 83036 HEMOGLOBIN GLYCOSYLATED A1C: CPT

## 2023-05-21 PROCEDURE — 86900 BLOOD TYPING SEROLOGIC ABO: CPT

## 2023-05-21 PROCEDURE — C1713: CPT

## 2023-05-21 PROCEDURE — 99285 EMERGENCY DEPT VISIT HI MDM: CPT

## 2023-05-21 RX ADMIN — Medication 975 MILLIGRAM(S): at 05:33

## 2023-05-21 RX ADMIN — Medication 2: at 12:25

## 2023-05-21 RX ADMIN — Medication 81 MILLIGRAM(S): at 12:25

## 2023-05-21 RX ADMIN — CARVEDILOL PHOSPHATE 3.12 MILLIGRAM(S): 80 CAPSULE, EXTENDED RELEASE ORAL at 05:32

## 2023-05-21 RX ADMIN — OXYCODONE HYDROCHLORIDE 10 MILLIGRAM(S): 5 TABLET ORAL at 08:41

## 2023-05-21 RX ADMIN — Medication 20 MILLIGRAM(S): at 05:33

## 2023-05-21 RX ADMIN — OXYCODONE HYDROCHLORIDE 10 MILLIGRAM(S): 5 TABLET ORAL at 09:40

## 2023-05-21 RX ADMIN — LOSARTAN POTASSIUM 25 MILLIGRAM(S): 100 TABLET, FILM COATED ORAL at 05:33

## 2023-05-21 RX ADMIN — CLOPIDOGREL BISULFATE 75 MILLIGRAM(S): 75 TABLET, FILM COATED ORAL at 12:25

## 2023-05-21 RX ADMIN — Medication 20 MILLIEQUIVALENT(S): at 12:25

## 2023-05-21 NOTE — PROGRESS NOTE ADULT - REASON FOR ADMISSION
left femur fracture

## 2023-05-21 NOTE — PROGRESS NOTE ADULT - SUBJECTIVE AND OBJECTIVE BOX
Ortho Post Op Check    Name: LONA STEINER    MR #: [id number]    Procedure: Left femur IM nail  Surgeon: Dr Jamil      Pt comfortable without complaints, pain controlled  Denies CP, SOB, N/V, numbness/tingling         PAST MEDICAL & SURGICAL HISTORY:  CAD (coronary artery disease)    Type 2 diabetes mellitus    Hypertension    Hyperlipidemia    S/P cardiac catheterization    S/P coronary artery stent placement    Fracture of right lower leg    S/P hernia repair              General Exam:  Vital Signs Last 24 Hrs  T(C): 36.8 (05-21-23 @ 04:18), Max: 36.8 (05-21-23 @ 04:18)  T(F): 98.2 (05-21-23 @ 04:18), Max: 98.2 (05-21-23 @ 04:18)  HR: 87 (05-21-23 @ 04:18) (87 - 87)  BP: 150/78 (05-21-23 @ 04:18) (150/78 - 150/78)  BP(mean): --  RR: 18 (05-21-23 @ 04:18) (18 - 18)  SpO2: 94% (05-21-23 @ 04:18) (94% - 94%)    General: Pt Alert and oriented, NAD, controlled pain.  Left thigh Dressings x 2 remain C/D/I. No bleeding.  Pulses: 2+ dorsalis pedis pulse. Cap refill < 2 sec.  Sensation: Grossly intact to light touch without deficit.  Motor: + EHL/FHL/TA/GS  Calf soft, supple and nontender. Thigh has expected mild swelling noted         MEDICATIONS  (STANDING):  acetaminophen     Tablet .. 975 milliGRAM(s) Oral every 8 hours  aspirin enteric coated 81 milliGRAM(s) Oral daily  atorvastatin 40 milliGRAM(s) Oral at bedtime  carvedilol 3.125 milliGRAM(s) Oral every 12 hours  clopidogrel Tablet 75 milliGRAM(s) Oral daily  dextrose 5%. 1000 milliLiter(s) (50 mL/Hr) IV Continuous <Continuous>  dextrose 5%. 1000 milliLiter(s) (100 mL/Hr) IV Continuous <Continuous>  dextrose 50% Injectable 25 Gram(s) IV Push once  dextrose 50% Injectable 12.5 Gram(s) IV Push once  dextrose 50% Injectable 25 Gram(s) IV Push once  furosemide    Tablet 20 milliGRAM(s) Oral daily  glucagon  Injectable 1 milliGRAM(s) IntraMuscular once  insulin lispro (ADMELOG) corrective regimen sliding scale   SubCutaneous at bedtime  insulin lispro (ADMELOG) corrective regimen sliding scale   SubCutaneous three times a day before meals  losartan 25 milliGRAM(s) Oral daily  potassium chloride    Tablet ER 20 milliEquivalent(s) Oral daily  senna 2 Tablet(s) Oral at bedtime    MEDICATIONS  (PRN):  aluminum hydroxide/magnesium hydroxide/simethicone Suspension 30 milliLiter(s) Oral four times a day PRN Indigestion  dextrose Oral Gel 15 Gram(s) Oral once PRN Blood Glucose LESS THAN 70 milliGRAM(s)/deciliter  HYDROmorphone   Tablet 4 milliGRAM(s) Oral every 3 hours PRN Severe Pain (7 - 10)  magnesium hydroxide Suspension 30 milliLiter(s) Oral daily PRN Constipation  ondansetron Injectable 4 milliGRAM(s) IV Push every 6 hours PRN Nausea and/or Vomiting  oxyCODONE    IR 10 milliGRAM(s) Oral every 3 hours PRN Moderate Pain (4 - 6)  oxyCODONE    IR 5 milliGRAM(s) Oral every 3 hours PRN Mild Pain (1 - 3)          A/P: 72yMale POD#3 s/p Left femur IM nail  - Stable  - Pain Control  - DVT ppx: Plavix and ASA  - Weight bearing status: WBAT w assist  - DC to home today

## 2023-05-22 NOTE — CDI QUERY NOTE - NSCDIOTHERTXTBX_GEN_ALL_CORE_HH
Documentation noted hemoglobin drop from 13.2g/dl to 10g/dl s/p surgery.    Please clarify if the clinical indicators support a further diagnosis  - Postoperative anemia due to blood loss  - Acute blood loss anemia  - Other (please specify)  - Not clinically significant    SUPPORTING DOCUMENTATION:    HEMOGLOBIN LEVELS:  Hemoglobin: 10.7 g/dL *L* [13.0 - 17.0] (05-20-23)  Hemoglobin: 13.2 g/dL [13.0 - 17.0] (05-17-23) Documentation noted hemoglobin drop from 13.2g/dl to 10g/dl s/p surgery.    Please clarify if the clinical indicators support a further diagnosis  - drop in hemoglobin  - Postoperative anemia due to blood loss  - Acute blood loss anemia  - Other (please specify)  - Not clinically significant    SUPPORTING DOCUMENTATION:    HEMOGLOBIN LEVELS:  Hemoglobin: 10.7 g/dL *L* [13.0 - 17.0] (05-20-23)  Hemoglobin: 13.2 g/dL [13.0 - 17.0] (05-17-23)

## 2023-05-26 PROBLEM — E11.9 TYPE 2 DIABETES MELLITUS WITHOUT COMPLICATIONS: Chronic | Status: ACTIVE | Noted: 2023-05-17

## 2023-05-26 PROBLEM — I25.10 ATHEROSCLEROTIC HEART DISEASE OF NATIVE CORONARY ARTERY WITHOUT ANGINA PECTORIS: Chronic | Status: ACTIVE | Noted: 2023-05-17

## 2023-05-26 PROBLEM — I10 ESSENTIAL (PRIMARY) HYPERTENSION: Chronic | Status: ACTIVE | Noted: 2023-05-17

## 2023-05-26 PROBLEM — E78.5 HYPERLIPIDEMIA, UNSPECIFIED: Chronic | Status: ACTIVE | Noted: 2023-05-17

## 2023-06-16 ENCOUNTER — APPOINTMENT (OUTPATIENT)
Dept: ORTHOPEDIC SURGERY | Facility: CLINIC | Age: 73
End: 2023-06-16
Payer: MEDICARE

## 2023-06-16 DIAGNOSIS — S72.92XA UNSPECIFIED FRACTURE OF LEFT FEMUR, INITIAL ENCOUNTER FOR CLOSED FRACTURE: ICD-10-CM

## 2023-06-16 PROCEDURE — 73552 X-RAY EXAM OF FEMUR 2/>: CPT | Mod: 26,LT

## 2023-06-16 PROCEDURE — 99024 POSTOP FOLLOW-UP VISIT: CPT

## 2023-06-16 NOTE — HISTORY OF PRESENT ILLNESS
[Xray (Date:___)] : [unfilled] Xray -  [Hardware in Good Position] : hardware in good position [Doing Well] : is doing well [No Sign of Infection] : is showing no signs of infection [Adequate Pain Control] : has adequate pain control [Healed] : healed [Neuro Intact] : an unremarkable neurological exam [Erythema] : not erythematous [Discharge] : absent of discharge [Swelling] : not swollen [Dehiscence] : not dehisced [de-identified] : s/p intramedullary nail, left femoral shaft fracture. 5/18/23 [de-identified] : 74 yo m s/p intramedullary nail, left femoral shaft fracture. 5/18/23 here for post op visit.  Patient is doing well. He is ambulating with a walker and advancing his weight bearing. He reports that he sometimes feels like he looses his balance.  He has not seen his PCP lately and has not discussed this with him. [de-identified] : xrays left femur 2 views:   [de-identified] : Patient will have physical therapy WBAT, ROM, strengthening, modalities.\par He will f/u with his PCP.\par He will call office for any concerns, otherwise, he will return in 1 month for eval and xrays left femur

## 2023-07-19 ENCOUNTER — APPOINTMENT (OUTPATIENT)
Dept: ORTHOPEDIC SURGERY | Facility: CLINIC | Age: 73
End: 2023-07-19
Payer: MEDICARE

## 2023-07-19 VITALS
DIASTOLIC BLOOD PRESSURE: 80 MMHG | BODY MASS INDEX: 34.54 KG/M2 | WEIGHT: 255 LBS | SYSTOLIC BLOOD PRESSURE: 130 MMHG | HEIGHT: 72 IN

## 2023-07-19 DIAGNOSIS — M79.652 PAIN IN LEFT THIGH: ICD-10-CM

## 2023-07-19 PROCEDURE — 73552 X-RAY EXAM OF FEMUR 2/>: CPT | Mod: LT

## 2023-07-19 PROCEDURE — 99024 POSTOP FOLLOW-UP VISIT: CPT

## 2023-07-19 NOTE — HISTORY OF PRESENT ILLNESS
[Healed] : healed [Neuro Intact] : an unremarkable neurological exam [Xray (Date:___)] : [unfilled] Xray -  [Hardware in Good Position] : hardware in good position [Doing Well] : is doing well [No Sign of Infection] : is showing no signs of infection [Adequate Pain Control] : has adequate pain control [None] : None [Erythema] : not erythematous [Discharge] : absent of discharge [Swelling] : not swollen [Dehiscence] : not dehisced [de-identified] : s/p intramedullary nail, left femoral shaft fracture. 5/18/23 [de-identified] : 72 yo m s/p intramedullary nail, left femoral shaft fracture. 5/18/23 here for post op visit.  Patient is doing fine. He was ambulating with a cane without issue but has been having some pain while walking. \par \par He states he had been doing well but was closing his backyard gate when he felt a sudden pop in his thigh and noted immediate pain.  It is different than the pain he had while the fracture was healing.   [de-identified] : Physical Exam:\par General: Well appearing, no acute distress, A&O\par Neurologic: A&Ox3, No focal deficits\par Head: NCAT without abrasions, lacerations, or ecchymosis to head, face, or scalp\par Respiratory: Equal chest wall expansion bilaterally, no accessory muscle use\par Lymphatic: No lymphadenopathy palpated\par Skin: Warm and dry\par Psychiatric: Normal mood and affect\par \par Incision well healed\par NTTP over GT\par +TA/EHL/GSc\par SILT L2-S1\par WWP [de-identified] : xrays left femur 2 views: Well healed proximal femoral shaft fracture with mature callus formation [de-identified] : He appears to have a new onset muscle injury.  This or a small band of scar tissue may have ribs during the twisting motion.  Patient will have physical therapy WBAT, ROM, strengthening, modalities.  If he is still having significant pain in a few weeks he will give us a call and we will discuss if he needs an MRI or other advanced imaging. \par He will call office for any concerns, otherwise, he will return as needed

## 2023-07-26 ENCOUNTER — APPOINTMENT (OUTPATIENT)
Dept: ORTHOPEDIC SURGERY | Facility: CLINIC | Age: 73
End: 2023-07-26

## 2024-09-18 NOTE — PROGRESS NOTE ADULT - NS_MD_PANP_GEN_ALL_CORE
Tetracycline Counseling: Patient counseled regarding possible photosensitivity and increased risk for sunburn.  Patient instructed to avoid sunlight, if possible.  When exposed to sunlight, patients should wear protective clothing, sunglasses, and sunscreen.  The patient was instructed to call the office immediately if the following severe adverse effects occur:  hearing changes, easy bruising/bleeding, severe headache, or vision changes.  The patient verbalized understanding of the proper use and possible adverse effects of tetracycline.  All of the patient's questions and concerns were addressed. Patient understands to avoid pregnancy while on therapy due to potential birth defects. Include Pregnancy/Lactation Warning?: No Doxycycline Pregnancy And Lactation Text: This medication is Pregnancy Category D and not consider safe during pregnancy. It is also excreted in breast milk but is considered safe for shorter treatment courses. Spironolactone Counseling: Patient advised regarding risks of diarrhea, abdominal pain, hyperkalemia, birth defects (for female patients), liver toxicity and renal toxicity. The patient may need blood work to monitor liver and kidney function and potassium levels while on therapy. The patient verbalized understanding of the proper use and possible adverse effects of spironolactone.  All of the patient's questions and concerns were addressed. Attending and PA/NP shared services statement (NON-critical care): Topical Clindamycin Pregnancy And Lactation Text: This medication is Pregnancy Category B and is considered safe during pregnancy. It is unknown if it is excreted in breast milk. Dapsone Pregnancy And Lactation Text: This medication is Pregnancy Category C and is not considered safe during pregnancy or breast feeding. Sarecycline Counseling: Patient advised regarding possible photosensitivity and discoloration of the teeth, skin, lips, tongue and gums.  Patient instructed to avoid sunlight, if possible.  When exposed to sunlight, patients should wear protective clothing, sunglasses, and sunscreen.  The patient was instructed to call the office immediately if the following severe adverse effects occur:  hearing changes, easy bruising/bleeding, severe headache, or vision changes.  The patient verbalized understanding of the proper use and possible adverse effects of sarecycline.  All of the patient's questions and concerns were addressed. Tazorac Pregnancy And Lactation Text: This medication is not safe during pregnancy. It is unknown if this medication is excreted in breast milk. Birth Control Pills Pregnancy And Lactation Text: This medication should be avoided if pregnant and for the first 30 days post-partum. Bactrim Pregnancy And Lactation Text: This medication is Pregnancy Category D and is known to cause fetal risk.  It is also excreted in breast milk. Azithromycin Pregnancy And Lactation Text: This medication is considered safe during pregnancy and is also secreted in breast milk. Minocycline Counseling: Patient advised regarding possible photosensitivity and discoloration of the teeth, skin, lips, tongue and gums.  Patient instructed to avoid sunlight, if possible.  When exposed to sunlight, patients should wear protective clothing, sunglasses, and sunscreen.  The patient was instructed to call the office immediately if the following severe adverse effects occur:  hearing changes, easy bruising/bleeding, severe headache, or vision changes.  The patient verbalized understanding of the proper use and possible adverse effects of minocycline.  All of the patient's questions and concerns were addressed. Topical Retinoid Pregnancy And Lactation Text: This medication is Pregnancy Category C. It is unknown if this medication is excreted in breast milk. Benzoyl Peroxide Counseling: Patient counseled that medicine may cause skin irritation and bleach clothing.  In the event of skin irritation, the patient was advised to reduce the amount of the drug applied or use it less frequently.   The patient verbalized understanding of the proper use and possible adverse effects of benzoyl peroxide.  All of the patient's questions and concerns were addressed. High Dose Vitamin A Pregnancy And Lactation Text: High dose vitamin A therapy is contraindicated during pregnancy and breast feeding. Azithromycin Counseling:  I discussed with the patient the risks of azithromycin including but not limited to GI upset, allergic reaction, drug rash, diarrhea, and yeast infections. Azelaic Acid Counseling: Patient counseled that medicine may cause skin irritation and to avoid applying near the eyes.  In the event of skin irritation, the patient was advised to reduce the amount of the drug applied or use it less frequently.   The patient verbalized understanding of the proper use and possible adverse effects of azelaic acid.  All of the patient's questions and concerns were addressed. Winlevi Pregnancy And Lactation Text: This medication is considered safe during pregnancy and breastfeeding. Isotretinoin Pregnancy And Lactation Text: This medication is Pregnancy Category X and is considered extremely dangerous during pregnancy. It is unknown if it is excreted in breast milk. Aklief counseling:  Patient advised to apply a pea-sized amount only at bedtime and wait 30 minutes after washing their face before applying.  If too drying, patient may add a non-comedogenic moisturizer.  The most commonly reported side effects including irritation, redness, scaling, dryness, stinging, burning, itching, and increased risk of sunburn.  The patient verbalized understanding of the proper use and possible adverse effects of retinoids.  All of the patient's questions and concerns were addressed. Erythromycin Pregnancy And Lactation Text: This medication is Pregnancy Category B and is considered safe during pregnancy. It is also excreted in breast milk. Topical Sulfur Applications Pregnancy And Lactation Text: This medication is Pregnancy Category C and has an unknown safety profile during pregnancy. It is unknown if this topical medication is excreted in breast milk. Tetracycline Pregnancy And Lactation Text: This medication is Pregnancy Category D and not consider safe during pregnancy. It is also excreted in breast milk. Doxycycline Counseling:  Patient counseled regarding possible photosensitivity and increased risk for sunburn.  Patient instructed to avoid sunlight, if possible.  When exposed to sunlight, patients should wear protective clothing, sunglasses, and sunscreen.  The patient was instructed to call the office immediately if the following severe adverse effects occur:  hearing changes, easy bruising/bleeding, severe headache, or vision changes.  The patient verbalized understanding of the proper use and possible adverse effects of doxycycline.  All of the patient's questions and concerns were addressed. Topical Clindamycin Counseling: Patient counseled that this medication may cause skin irritation or allergic reactions.  In the event of skin irritation, the patient was advised to reduce the amount of the drug applied or use it less frequently.   The patient verbalized understanding of the proper use and possible adverse effects of clindamycin.  All of the patient's questions and concerns were addressed. Winlevi Counseling:  I discussed with the patient the risks of topical clascoterone including but not limited to erythema, scaling, itching, and stinging. Patient voiced their understanding. Spironolactone Pregnancy And Lactation Text: This medication can cause feminization of the male fetus and should be avoided during pregnancy. The active metabolite is also found in breast milk. Dapsone Counseling: I discussed with the patient the risks of dapsone including but not limited to hemolytic anemia, agranulocytosis, rashes, methemoglobinemia, kidney failure, peripheral neuropathy, headaches, GI upset, and liver toxicity.  Patients who start dapsone require monitoring including baseline LFTs and weekly CBCs for the first month, then every month thereafter.  The patient verbalized understanding of the proper use and possible adverse effects of dapsone.  All of the patient's questions and concerns were addressed. Tazorac Counseling:  Patient advised that medication is irritating and drying.  Patient may need to apply sparingly and wash off after an hour before eventually leaving it on overnight.  The patient verbalized understanding of the proper use and possible adverse effects of tazorac.  All of the patient's questions and concerns were addressed. Birth Control Pills Counseling: Birth Control Pill Counseling: I discussed with the patient the potential side effects of OCPs including but not limited to increased risk of stroke, heart attack, thrombophlebitis, deep venous thrombosis, hepatic adenomas, breast changes, GI upset, headaches, and depression.  The patient verbalized understanding of the proper use and possible adverse effects of OCPs. All of the patient's questions and concerns were addressed. Topical Retinoid counseling:  Patient advised to apply a pea-sized amount only at bedtime and wait 30 minutes after washing their face before applying.  If too drying, patient may add a non-comedogenic moisturizer. The patient verbalized understanding of the proper use and possible adverse effects of retinoids.  All of the patient's questions and concerns were addressed. Bactrim Counseling:  I discussed with the patient the risks of sulfa antibiotics including but not limited to GI upset, allergic reaction, drug rash, diarrhea, dizziness, photosensitivity, and yeast infections.  Rarely, more serious reactions can occur including but not limited to aplastic anemia, agranulocytosis, methemoglobinemia, blood dyscrasias, liver or kidney failure, lung infiltrates or desquamative/blistering drug rashes. High Dose Vitamin A Counseling: Side effects reviewed, pt to contact office should one occur. Benzoyl Peroxide Pregnancy And Lactation Text: This medication is Pregnancy Category C. It is unknown if benzoyl peroxide is excreted in breast milk. Azelaic Acid Pregnancy And Lactation Text: This medication is considered safe during pregnancy and breast feeding. Detail Level: Zone Isotretinoin Counseling: Patient should get monthly blood tests, not donate blood, not drive at night if vision affected, not share medication, and not undergo elective surgery for 6 months after tx completed. Side effects reviewed, pt to contact office should one occur. Erythromycin Counseling:  I discussed with the patient the risks of erythromycin including but not limited to GI upset, allergic reaction, drug rash, diarrhea, increase in liver enzymes, and yeast infections. Aklief Pregnancy And Lactation Text: It is unknown if this medication is safe to use during pregnancy.  It is unknown if this medication is excreted in breast milk.  Breastfeeding women should use the topical cream on the smallest area of the skin for the shortest time needed while breastfeeding.  Do not apply to nipple and areola. Topical Sulfur Applications Counseling: Topical Sulfur Counseling: Patient counseled that this medication may cause skin irritation or allergic reactions.  In the event of skin irritation, the patient was advised to reduce the amount of the drug applied or use it less frequently.   The patient verbalized understanding of the proper use and possible adverse effects of topical sulfur application.  All of the patient's questions and concerns were addressed. Detail Level: Simple

## 2025-05-20 NOTE — H&P ADULT - NSHPPHYSICALEXAM_GEN_ALL_CORE
PE: NAD, alert awake  Left LE: Hip skin intact, no obvious open wounds  No obvious deformities, +TTP proximal thigh  Unable to SLR, + EHL/TA/FHL/GS intact  Gross SILT s/s/DP/SP/tib distrib  DP pulse 2+, calf soft, NT B/L
Greater than 2 late decelerations in 30 minutes

## (undated) DEVICE — DRAPE C ARM UNIVERSAL

## (undated) DEVICE — SUT MONOCRYL 2-0 27" SH UNDYED

## (undated) DEVICE — SUT VICRYL PLUS 0 27" CT-2 UNDYED

## (undated) DEVICE — VENODYNE/SCD SLEEVE CALF MEDIUM

## (undated) DEVICE — DRAPE C ARM C-ARMOUR

## (undated) DEVICE — GLV 7.5 PROTEXIS (WHITE)

## (undated) DEVICE — SYNTHES REAMING ROD WITH BALL TIP 2.5MM 950MM

## (undated) DEVICE — DRSG WEBRIL 6"

## (undated) DEVICE — PACK EXTREMITY

## (undated) DEVICE — FRAZIER SUCTION TIP 10FR

## (undated) DEVICE — GLV 8 PROTEXIS (BLUE)

## (undated) DEVICE — SUT NYLON 3-0 18" PS-2

## (undated) DEVICE — DRAPE SPLIT SHEET 77" X 108"

## (undated) DEVICE — ELCTR GROUNDING PAD ADULT COVIDIEN

## (undated) DEVICE — DRAPE TOWEL BLUE 17" X 24"

## (undated) DEVICE — SUT MONOCRYL 3-0 27" PS-2 UNDYED

## (undated) DEVICE — DRAPE MAYO STAND 23"